# Patient Record
Sex: MALE | Race: WHITE | NOT HISPANIC OR LATINO | Employment: UNEMPLOYED | URBAN - METROPOLITAN AREA
[De-identification: names, ages, dates, MRNs, and addresses within clinical notes are randomized per-mention and may not be internally consistent; named-entity substitution may affect disease eponyms.]

---

## 2017-01-04 ENCOUNTER — OFFICE VISIT - RIVER FALLS (OUTPATIENT)
Dept: FAMILY MEDICINE | Facility: CLINIC | Age: 67
End: 2017-01-04

## 2018-01-08 ENCOUNTER — OFFICE VISIT - RIVER FALLS (OUTPATIENT)
Dept: FAMILY MEDICINE | Facility: CLINIC | Age: 68
End: 2018-01-08

## 2018-01-08 ASSESSMENT — MIFFLIN-ST. JEOR: SCORE: 1819.52

## 2018-01-09 LAB
CHOLEST SERPL-MCNC: 264 MG/DL
CHOLEST/HDLC SERPL: 6.4 {RATIO}
CREAT SERPL-MCNC: 1.14 MG/DL (ref 0.7–1.25)
GLUCOSE BLD-MCNC: 90 MG/DL (ref 65–99)
HDLC SERPL-MCNC: 41 MG/DL
LDLC SERPL CALC-MCNC: 191 MG/DL
NONHDLC SERPL-MCNC: 223 MG/DL
PSA SERPL-MCNC: 0.5 NG/ML
TRIGL SERPL-MCNC: 159 MG/DL

## 2018-02-14 ENCOUNTER — OFFICE VISIT - RIVER FALLS (OUTPATIENT)
Dept: FAMILY MEDICINE | Facility: CLINIC | Age: 68
End: 2018-02-14

## 2018-03-05 ENCOUNTER — OFFICE VISIT - RIVER FALLS (OUTPATIENT)
Dept: FAMILY MEDICINE | Facility: CLINIC | Age: 68
End: 2018-03-05

## 2018-03-05 ASSESSMENT — MIFFLIN-ST. JEOR: SCORE: 1819.52

## 2018-05-24 ENCOUNTER — AMBULATORY - RIVER FALLS (OUTPATIENT)
Dept: FAMILY MEDICINE | Facility: CLINIC | Age: 68
End: 2018-05-24

## 2018-05-25 LAB
CHOLEST SERPL-MCNC: 198 MG/DL
CHOLEST/HDLC SERPL: 4.4 {RATIO}
CREAT SERPL-MCNC: 1.24 MG/DL (ref 0.7–1.25)
GLUCOSE BLD-MCNC: 86 MG/DL (ref 65–99)
HDLC SERPL-MCNC: 45 MG/DL
LDLC SERPL CALC-MCNC: 132 MG/DL
NONHDLC SERPL-MCNC: 153 MG/DL
TRIGL SERPL-MCNC: 104 MG/DL

## 2018-05-29 ENCOUNTER — OFFICE VISIT - RIVER FALLS (OUTPATIENT)
Dept: FAMILY MEDICINE | Facility: CLINIC | Age: 68
End: 2018-05-29

## 2018-05-29 ASSESSMENT — MIFFLIN-ST. JEOR: SCORE: 1790.49

## 2018-05-30 ENCOUNTER — OFFICE VISIT - RIVER FALLS (OUTPATIENT)
Dept: FAMILY MEDICINE | Facility: CLINIC | Age: 68
End: 2018-05-30

## 2018-07-03 ENCOUNTER — OFFICE VISIT - RIVER FALLS (OUTPATIENT)
Dept: FAMILY MEDICINE | Facility: CLINIC | Age: 68
End: 2018-07-03

## 2018-07-03 ASSESSMENT — MIFFLIN-ST. JEOR: SCORE: 1789.58

## 2018-11-05 ENCOUNTER — AMBULATORY - RIVER FALLS (OUTPATIENT)
Dept: FAMILY MEDICINE | Facility: CLINIC | Age: 68
End: 2018-11-05

## 2018-12-10 ENCOUNTER — OFFICE VISIT - RIVER FALLS (OUTPATIENT)
Dept: FAMILY MEDICINE | Facility: CLINIC | Age: 68
End: 2018-12-10

## 2018-12-10 ASSESSMENT — MIFFLIN-ST. JEOR: SCORE: 1776.88

## 2018-12-11 LAB
CHOLEST SERPL-MCNC: 265 MG/DL
CHOLEST/HDLC SERPL: 5.5 {RATIO}
CREAT SERPL-MCNC: 1.21 MG/DL (ref 0.7–1.25)
GLUCOSE BLD-MCNC: 88 MG/DL (ref 65–99)
HDLC SERPL-MCNC: 48 MG/DL
LDLC SERPL CALC-MCNC: 188 MG/DL
NONHDLC SERPL-MCNC: 217 MG/DL
PSA SERPL-MCNC: 0.6 NG/ML
TRIGL SERPL-MCNC: 144 MG/DL

## 2019-03-27 ENCOUNTER — AMBULATORY - RIVER FALLS (OUTPATIENT)
Dept: FAMILY MEDICINE | Facility: CLINIC | Age: 69
End: 2019-03-27

## 2019-03-28 LAB
A/G RATIO - HISTORICAL: 1.7 (ref 1–2.5)
ALBUMIN SERPL-MCNC: 4.3 GM/DL (ref 3.6–5.1)
ALP SERPL-CCNC: 87 UNIT/L (ref 40–115)
ALT SERPL W P-5'-P-CCNC: 17 UNIT/L (ref 9–46)
AST SERPL W P-5'-P-CCNC: 25 UNIT/L (ref 10–35)
BILIRUB SERPL-MCNC: 0.7 MG/DL (ref 0.2–1.2)
BUN SERPL-MCNC: 20 MG/DL (ref 7–25)
BUN/CREAT RATIO - HISTORICAL: ABNORMAL (ref 6–22)
CALCIUM SERPL-MCNC: 9 MG/DL (ref 8.6–10.3)
CHLORIDE BLD-SCNC: 105 MMOL/L (ref 98–110)
CHOLEST SERPL-MCNC: 182 MG/DL
CHOLEST/HDLC SERPL: 4 {RATIO}
CO2 SERPL-SCNC: 24 MMOL/L (ref 20–32)
CREAT SERPL-MCNC: 1.23 MG/DL (ref 0.7–1.25)
EGFRCR SERPLBLD CKD-EPI 2021: 61 ML/MIN/1.73M2
ERYTHROCYTE [DISTWIDTH] IN BLOOD BY AUTOMATED COUNT: 13.1 % (ref 11–15)
GLOBULIN: 2.6 (ref 1.9–3.7)
GLUCOSE BLD-MCNC: 143 MG/DL (ref 65–99)
HCT VFR BLD AUTO: 43.5 % (ref 38.5–50)
HDLC SERPL-MCNC: 46 MG/DL
HGB BLD-MCNC: 15.5 GM/DL (ref 13.2–17.1)
LDLC SERPL CALC-MCNC: 107 MG/DL
MCH RBC QN AUTO: 31.6 PG (ref 27–33)
MCHC RBC AUTO-ENTMCNC: 35.6 GM/DL (ref 32–36)
MCV RBC AUTO: 88.6 FL (ref 80–100)
NONHDLC SERPL-MCNC: 136 MG/DL
PLATELET # BLD AUTO: 291 10*3/UL (ref 140–400)
PMV BLD: 10.4 FL (ref 7.5–12.5)
POTASSIUM BLD-SCNC: 3.7 MMOL/L (ref 3.5–5.3)
PROT SERPL-MCNC: 6.9 GM/DL (ref 6.1–8.1)
RBC # BLD AUTO: 4.91 10*6/UL (ref 4.2–5.8)
SODIUM SERPL-SCNC: 139 MMOL/L (ref 135–146)
TRIGL SERPL-MCNC: 177 MG/DL
WBC # BLD AUTO: 7.3 10*3/UL (ref 3.8–10.8)

## 2019-04-02 ENCOUNTER — OFFICE VISIT - RIVER FALLS (OUTPATIENT)
Dept: FAMILY MEDICINE | Facility: CLINIC | Age: 69
End: 2019-04-02

## 2019-04-02 ASSESSMENT — MIFFLIN-ST. JEOR: SCORE: 1791.4

## 2019-05-02 ENCOUNTER — OFFICE VISIT - RIVER FALLS (OUTPATIENT)
Dept: FAMILY MEDICINE | Facility: CLINIC | Age: 69
End: 2019-05-02

## 2019-05-02 ASSESSMENT — MIFFLIN-ST. JEOR: SCORE: 1784.14

## 2019-10-08 ENCOUNTER — AMBULATORY - RIVER FALLS (OUTPATIENT)
Dept: FAMILY MEDICINE | Facility: CLINIC | Age: 69
End: 2019-10-08

## 2019-10-23 ENCOUNTER — AMBULATORY - RIVER FALLS (OUTPATIENT)
Dept: FAMILY MEDICINE | Facility: CLINIC | Age: 69
End: 2019-10-23

## 2019-10-24 ENCOUNTER — COMMUNICATION - RIVER FALLS (OUTPATIENT)
Dept: FAMILY MEDICINE | Facility: CLINIC | Age: 69
End: 2019-10-24

## 2019-10-24 LAB
A/G RATIO - HISTORICAL: 1.6 (ref 1–2.5)
ALBUMIN SERPL-MCNC: 4.1 GM/DL (ref 3.6–5.1)
ALP SERPL-CCNC: 77 UNIT/L (ref 40–115)
ALT SERPL W P-5'-P-CCNC: 16 UNIT/L (ref 9–46)
AST SERPL W P-5'-P-CCNC: 20 UNIT/L (ref 10–35)
BILIRUB SERPL-MCNC: 0.5 MG/DL (ref 0.2–1.2)
BUN SERPL-MCNC: 20 MG/DL (ref 7–25)
BUN/CREAT RATIO - HISTORICAL: NORMAL (ref 6–22)
CALCIUM SERPL-MCNC: 9.4 MG/DL (ref 8.6–10.3)
CHLORIDE BLD-SCNC: 104 MMOL/L (ref 98–110)
CHOLEST SERPL-MCNC: 176 MG/DL
CHOLEST/HDLC SERPL: 3.5 {RATIO}
CO2 SERPL-SCNC: 26 MMOL/L (ref 20–32)
CREAT SERPL-MCNC: 1.21 MG/DL (ref 0.7–1.25)
EGFRCR SERPLBLD CKD-EPI 2021: 62 ML/MIN/1.73M2
ERYTHROCYTE [DISTWIDTH] IN BLOOD BY AUTOMATED COUNT: 12.8 % (ref 11–15)
GLOBULIN: 2.6 (ref 1.9–3.7)
GLUCOSE BLD-MCNC: 85 MG/DL (ref 65–99)
HCT VFR BLD AUTO: 44.5 % (ref 38.5–50)
HDLC SERPL-MCNC: 50 MG/DL
HGB BLD-MCNC: 15.7 GM/DL (ref 13.2–17.1)
LDLC SERPL CALC-MCNC: 105 MG/DL
MCH RBC QN AUTO: 31.5 PG (ref 27–33)
MCHC RBC AUTO-ENTMCNC: 35.3 GM/DL (ref 32–36)
MCV RBC AUTO: 89.4 FL (ref 80–100)
NONHDLC SERPL-MCNC: 126 MG/DL
PLATELET # BLD AUTO: 279 10*3/UL (ref 140–400)
PMV BLD: 10.1 FL (ref 7.5–12.5)
POTASSIUM BLD-SCNC: 4.2 MMOL/L (ref 3.5–5.3)
PROT SERPL-MCNC: 6.7 GM/DL (ref 6.1–8.1)
RBC # BLD AUTO: 4.98 10*6/UL (ref 4.2–5.8)
SODIUM SERPL-SCNC: 140 MMOL/L (ref 135–146)
TRIGL SERPL-MCNC: 113 MG/DL
WBC # BLD AUTO: 7 10*3/UL (ref 3.8–10.8)

## 2019-11-07 ENCOUNTER — OFFICE VISIT - RIVER FALLS (OUTPATIENT)
Dept: FAMILY MEDICINE | Facility: CLINIC | Age: 69
End: 2019-11-07

## 2019-11-07 ASSESSMENT — MIFFLIN-ST. JEOR: SCORE: 1780.51

## 2020-04-09 ENCOUNTER — OFFICE VISIT - RIVER FALLS (OUTPATIENT)
Dept: FAMILY MEDICINE | Facility: CLINIC | Age: 70
End: 2020-04-09

## 2020-05-11 ENCOUNTER — COMMUNICATION - RIVER FALLS (OUTPATIENT)
Dept: FAMILY MEDICINE | Facility: CLINIC | Age: 70
End: 2020-05-11

## 2020-07-01 ENCOUNTER — AMBULATORY - RIVER FALLS (OUTPATIENT)
Dept: FAMILY MEDICINE | Facility: CLINIC | Age: 70
End: 2020-07-01

## 2020-07-02 LAB
A/G RATIO - HISTORICAL: 1.8 (ref 1–2.5)
ALBUMIN SERPL-MCNC: 4.4 GM/DL (ref 3.6–5.1)
ALP SERPL-CCNC: 81 UNIT/L (ref 35–144)
ALT SERPL W P-5'-P-CCNC: 26 UNIT/L (ref 9–46)
AST SERPL W P-5'-P-CCNC: 30 UNIT/L (ref 10–35)
BILIRUB SERPL-MCNC: 0.7 MG/DL (ref 0.2–1.2)
BUN SERPL-MCNC: 23 MG/DL (ref 7–25)
BUN/CREAT RATIO - HISTORICAL: NORMAL (ref 6–22)
CALCIUM SERPL-MCNC: 9.6 MG/DL (ref 8.6–10.3)
CHLORIDE BLD-SCNC: 104 MMOL/L (ref 98–110)
CHOLEST SERPL-MCNC: 203 MG/DL
CHOLEST/HDLC SERPL: 4 {RATIO}
CO2 SERPL-SCNC: 24 MMOL/L (ref 20–32)
CREAT SERPL-MCNC: 1.1 MG/DL (ref 0.7–1.25)
EGFRCR SERPLBLD CKD-EPI 2021: 69 ML/MIN/1.73M2
ERYTHROCYTE [DISTWIDTH] IN BLOOD BY AUTOMATED COUNT: 12.8 % (ref 11–15)
GLOBULIN: 2.5 (ref 1.9–3.7)
GLUCOSE BLD-MCNC: 93 MG/DL (ref 65–99)
HCT VFR BLD AUTO: 46.8 % (ref 38.5–50)
HDLC SERPL-MCNC: 51 MG/DL
HGB BLD-MCNC: 16 GM/DL (ref 13.2–17.1)
LDLC SERPL CALC-MCNC: 126 MG/DL
MCH RBC QN AUTO: 31.2 PG (ref 27–33)
MCHC RBC AUTO-ENTMCNC: 34.2 GM/DL (ref 32–36)
MCV RBC AUTO: 91.2 FL (ref 80–100)
NONHDLC SERPL-MCNC: 152 MG/DL
PLATELET # BLD AUTO: 307 10*3/UL (ref 140–400)
PMV BLD: 10.2 FL (ref 7.5–12.5)
POTASSIUM BLD-SCNC: 4.1 MMOL/L (ref 3.5–5.3)
PROT SERPL-MCNC: 6.9 GM/DL (ref 6.1–8.1)
PSA SERPL-MCNC: 1.3 NG/ML
RBC # BLD AUTO: 5.13 10*6/UL (ref 4.2–5.8)
SODIUM SERPL-SCNC: 138 MMOL/L (ref 135–146)
TRIGL SERPL-MCNC: 145 MG/DL
WBC # BLD AUTO: 7.5 10*3/UL (ref 3.8–10.8)

## 2020-07-06 ENCOUNTER — COMMUNICATION - RIVER FALLS (OUTPATIENT)
Dept: FAMILY MEDICINE | Facility: CLINIC | Age: 70
End: 2020-07-06

## 2020-07-07 ENCOUNTER — OFFICE VISIT - RIVER FALLS (OUTPATIENT)
Dept: FAMILY MEDICINE | Facility: CLINIC | Age: 70
End: 2020-07-07

## 2020-07-07 ASSESSMENT — MIFFLIN-ST. JEOR: SCORE: 1780.51

## 2021-02-10 ENCOUNTER — TRANSFERRED RECORDS (OUTPATIENT)
Dept: MULTI SPECIALTY CLINIC | Facility: CLINIC | Age: 71
End: 2021-02-10
Payer: COMMERCIAL

## 2021-02-10 ENCOUNTER — OFFICE VISIT - RIVER FALLS (OUTPATIENT)
Dept: FAMILY MEDICINE | Facility: CLINIC | Age: 71
End: 2021-02-10

## 2021-02-10 ASSESSMENT — MIFFLIN-ST. JEOR: SCORE: 1821.33

## 2021-02-11 ENCOUNTER — COMMUNICATION - RIVER FALLS (OUTPATIENT)
Dept: FAMILY MEDICINE | Facility: CLINIC | Age: 71
End: 2021-02-11

## 2021-02-11 LAB
A/G RATIO - HISTORICAL: 1.6 (ref 1–2.5)
ALBUMIN SERPL-MCNC: 4.3 GM/DL (ref 3.6–5.1)
ALP SERPL-CCNC: 80 UNIT/L (ref 35–144)
ALT SERPL W P-5'-P-CCNC: 15 UNIT/L (ref 9–46)
AST SERPL W P-5'-P-CCNC: 17 UNIT/L (ref 10–35)
BILIRUB SERPL-MCNC: 0.5 MG/DL (ref 0.2–1.2)
BUN SERPL-MCNC: 24 MG/DL (ref 7–25)
BUN/CREAT RATIO - HISTORICAL: ABNORMAL (ref 6–22)
CALCIUM SERPL-MCNC: 9.6 MG/DL (ref 8.6–10.3)
CHLORIDE BLD-SCNC: 101 MMOL/L (ref 98–110)
CHOLEST SERPL-MCNC: 208 MG/DL
CHOLEST/HDLC SERPL: 4.2 {RATIO}
CO2 SERPL-SCNC: 30 MMOL/L (ref 20–32)
CREAT SERPL-MCNC: 1.24 MG/DL (ref 0.7–1.25)
EGFRCR SERPLBLD CKD-EPI 2021: 59 ML/MIN/1.73M2
ERYTHROCYTE [DISTWIDTH] IN BLOOD BY AUTOMATED COUNT: 13 % (ref 11–15)
GLOBULIN: 2.7 (ref 1.9–3.7)
GLUCOSE BLD-MCNC: 86 MG/DL (ref 65–99)
HCT VFR BLD AUTO: 45.3 % (ref 38.5–50)
HDLC SERPL-MCNC: 49 MG/DL
HGB BLD-MCNC: 15.6 GM/DL (ref 13.2–17.1)
LDLC SERPL CALC-MCNC: 138 MG/DL
MCH RBC QN AUTO: 31 PG (ref 27–33)
MCHC RBC AUTO-ENTMCNC: 34.4 GM/DL (ref 32–36)
MCV RBC AUTO: 89.9 FL (ref 80–100)
NONHDLC SERPL-MCNC: 159 MG/DL
PLATELET # BLD AUTO: 273 10*3/UL (ref 140–400)
PMV BLD: 9.9 FL (ref 7.5–12.5)
POTASSIUM BLD-SCNC: 4.1 MMOL/L (ref 3.5–5.3)
PROT SERPL-MCNC: 7 GM/DL (ref 6.1–8.1)
RBC # BLD AUTO: 5.04 10*6/UL (ref 4.2–5.8)
SODIUM SERPL-SCNC: 138 MMOL/L (ref 135–146)
TRIGL SERPL-MCNC: 106 MG/DL
WBC # BLD AUTO: 6.7 10*3/UL (ref 3.8–10.8)

## 2021-08-02 ENCOUNTER — AMBULATORY - RIVER FALLS (OUTPATIENT)
Dept: FAMILY MEDICINE | Facility: CLINIC | Age: 71
End: 2021-08-02

## 2021-08-03 ENCOUNTER — COMMUNICATION - RIVER FALLS (OUTPATIENT)
Dept: FAMILY MEDICINE | Facility: CLINIC | Age: 71
End: 2021-08-03

## 2021-08-03 LAB
BUN SERPL-MCNC: 21 MG/DL (ref 7–25)
BUN/CREAT RATIO - HISTORICAL: NORMAL (ref 6–22)
CALCIUM SERPL-MCNC: 9.3 MG/DL (ref 8.6–10.3)
CHLORIDE BLD-SCNC: 104 MMOL/L (ref 98–110)
CHOLEST SERPL-MCNC: 186 MG/DL
CHOLEST/HDLC SERPL: 3.9 {RATIO}
CO2 SERPL-SCNC: 28 MMOL/L (ref 20–32)
CREAT SERPL-MCNC: 1.06 MG/DL (ref 0.7–1.25)
EGFRCR SERPLBLD CKD-EPI 2021: 71 ML/MIN/1.73M2
GLUCOSE BLD-MCNC: 88 MG/DL (ref 65–99)
HDLC SERPL-MCNC: 48 MG/DL
LDLC SERPL CALC-MCNC: 113 MG/DL
NONHDLC SERPL-MCNC: 138 MG/DL
POTASSIUM BLD-SCNC: 4.4 MMOL/L (ref 3.5–5.3)
SODIUM SERPL-SCNC: 140 MMOL/L (ref 135–146)
TRIGL SERPL-MCNC: 135 MG/DL

## 2021-08-06 ENCOUNTER — OFFICE VISIT - RIVER FALLS (OUTPATIENT)
Dept: FAMILY MEDICINE | Facility: CLINIC | Age: 71
End: 2021-08-06

## 2021-08-06 ASSESSMENT — MIFFLIN-ST. JEOR: SCORE: 1808.63

## 2021-10-04 ENCOUNTER — COMMUNICATION - RIVER FALLS (OUTPATIENT)
Dept: FAMILY MEDICINE | Facility: CLINIC | Age: 71
End: 2021-10-04

## 2022-02-12 VITALS
WEIGHT: 235 LBS | BODY MASS INDEX: 34.8 KG/M2 | HEART RATE: 72 BPM | DIASTOLIC BLOOD PRESSURE: 74 MMHG | OXYGEN SATURATION: 97 % | HEIGHT: 69 IN | SYSTOLIC BLOOD PRESSURE: 122 MMHG

## 2022-02-12 VITALS
DIASTOLIC BLOOD PRESSURE: 84 MMHG | BODY MASS INDEX: 33.83 KG/M2 | WEIGHT: 226.8 LBS | BODY MASS INDEX: 33.59 KG/M2 | SYSTOLIC BLOOD PRESSURE: 132 MMHG | HEIGHT: 69 IN | HEART RATE: 72 BPM | OXYGEN SATURATION: 97 % | OXYGEN SATURATION: 98 % | TEMPERATURE: 97.2 F | HEART RATE: 78 BPM | DIASTOLIC BLOOD PRESSURE: 82 MMHG | HEIGHT: 69 IN | WEIGHT: 228.4 LBS | SYSTOLIC BLOOD PRESSURE: 130 MMHG

## 2022-02-12 VITALS
DIASTOLIC BLOOD PRESSURE: 78 MMHG | HEART RATE: 65 BPM | HEIGHT: 69 IN | SYSTOLIC BLOOD PRESSURE: 116 MMHG | WEIGHT: 228 LBS | BODY MASS INDEX: 33.77 KG/M2 | TEMPERATURE: 97.3 F

## 2022-02-12 VITALS
TEMPERATURE: 97.5 F | HEART RATE: 79 BPM | OXYGEN SATURATION: 98 % | WEIGHT: 225.2 LBS | HEIGHT: 69 IN | BODY MASS INDEX: 33.36 KG/M2 | DIASTOLIC BLOOD PRESSURE: 86 MMHG | SYSTOLIC BLOOD PRESSURE: 146 MMHG

## 2022-02-12 VITALS
BODY MASS INDEX: 33.8 KG/M2 | SYSTOLIC BLOOD PRESSURE: 136 MMHG | TEMPERATURE: 97.9 F | SYSTOLIC BLOOD PRESSURE: 126 MMHG | BODY MASS INDEX: 34.75 KG/M2 | DIASTOLIC BLOOD PRESSURE: 74 MMHG | WEIGHT: 228.2 LBS | HEART RATE: 65 BPM | OXYGEN SATURATION: 97 % | HEART RATE: 94 BPM | DIASTOLIC BLOOD PRESSURE: 72 MMHG | WEIGHT: 234.6 LBS | HEIGHT: 69 IN | OXYGEN SATURATION: 97 % | HEIGHT: 69 IN

## 2022-02-12 VITALS
HEIGHT: 69 IN | OXYGEN SATURATION: 97 % | BODY MASS INDEX: 33.47 KG/M2 | WEIGHT: 226 LBS | DIASTOLIC BLOOD PRESSURE: 74 MMHG | HEART RATE: 72 BPM | SYSTOLIC BLOOD PRESSURE: 122 MMHG

## 2022-02-12 VITALS
BODY MASS INDEX: 34.75 KG/M2 | HEIGHT: 69 IN | DIASTOLIC BLOOD PRESSURE: 80 MMHG | WEIGHT: 234.6 LBS | SYSTOLIC BLOOD PRESSURE: 128 MMHG | OXYGEN SATURATION: 97 % | HEART RATE: 70 BPM

## 2022-02-12 VITALS
SYSTOLIC BLOOD PRESSURE: 119 MMHG | HEIGHT: 69 IN | OXYGEN SATURATION: 97 % | BODY MASS INDEX: 34.39 KG/M2 | TEMPERATURE: 97.2 F | HEART RATE: 68 BPM | WEIGHT: 232.2 LBS | DIASTOLIC BLOOD PRESSURE: 83 MMHG

## 2022-02-12 VITALS — HEIGHT: 69 IN | BODY MASS INDEX: 33.37 KG/M2

## 2022-02-12 VITALS
WEIGHT: 226 LBS | BODY MASS INDEX: 33.47 KG/M2 | HEIGHT: 69 IN | OXYGEN SATURATION: 97 % | HEART RATE: 71 BPM | SYSTOLIC BLOOD PRESSURE: 126 MMHG | DIASTOLIC BLOOD PRESSURE: 80 MMHG

## 2022-02-15 NOTE — PROGRESS NOTES
Patient:   ABELARDO TINOCO            MRN: [id number]            FIN: [id number]               Age:   68 years     Sex:  Male     :  1950   Associated Diagnoses:   Chronic GERD; Familial Hyperlipidemia; Benign essential HTN   Author:   Osman Courtney MD      Impression and Plan   Diagnosis     Chronic GERD (RTC03-TY K21.9).     Course:  Progressing as expected.    Orders     Orders   Charges (Evaluation and Management):  90887 office outpatient visit 25 minutes (Charge) (Order): Quantity: 1, Chronic GERD  Benign essential HTN  Familial Hyperlipidemia.     Orders (Selected)   Prescriptions  Prescribe  raNITIdine 75 mg oral tablet: = 1 tab(s) ( 75 mg ), PO, daily, # 90 tab(s), 1 Refill(s), Type: Maintenance, Pharmacy: [facility], 1 tab(s) Oral daily.     Diagnosis     Familial Hyperlipidemia (PNL41-RK E78.49).     Course:  Progressing as expected.    Orders     Orders (Selected)   Prescriptions  Prescribed  atorvastatin 40 mg oral tablet: = 0.5 tab(s) ( 20 mg ), PO, Daily, # 90 tab(s), 1 Refill(s), Type: Maintenance, Pharmacy: [facility], 0.5 tab(s) Oral daily.     Diagnosis     Benign essential HTN (YPY57-RT I10).     Course:  Well controlled.    Orders     Orders (Selected)   Prescriptions  Prescribed  hydrochlorothiazide-triamterene 25 mg-37.5 mg oral tablet: 1 tab(s), Oral, daily, Instructions: Pt due for appt, # 90 tab(s), 1 Refill(s), CHERY, Type: Maintenance, Pharmacy: [facility], Pt will need appt for refills, 1 tab(s) Oral daily,Instr:Pt due for appt.        Visit Information      Date of Service: 2019 08:19 am  Performing Location: [facility]  Encounter#: [id number]      Primary Care Provider (PCP):  Osman Courtney MD    NPI# [id number]   Visit type:  Scheduled follow-up.    Accompanied by:  No one.    Source of history:  Self.    Referral source:  Self.    History limitation:  None.       Chief Complaint   2019 8:29 AM CDT     Pt here for med ck         History of Present Illness             The patient presents for follow-up evaluation of hypertension.  The quality of hypertension symptom(s) since the patient's last visit is described as being unchanged.  The severity of the hypertension symptom(s) since the last visit is moderate.  Since the patient's last visit, the timing/course of hypertension symptom(s) is constant.  Exacerbating factors consist of none.  Relieving factors consist of medication.  Associated symptoms consist of none.  Prior treatment consists of lifestyle modification (weight reduction, dietary sodium restriction, increased physical activity, adoption of DASH eating plan).  Medical encounters: none.  Compliance problems: none.               The patient presents with a knee problem.  The location of the knee problem is generalized, the right knee.  The knee problem is characterized by popping and pain.  The severity of the knee problem is severe.  The timing/course of symptom(s) associated with the knee problem is constant.  The knee problem has lasted for 1 week(s).  Radiation of pain: none.  There are no modifying factors.  Associated symptoms consist of none.        Interval History   Cholesterol Management   Total cholesterol results < 200 mg/dL (optimal).  LDL cholesterol results < 100 mg/dL (optimal).  Triglyceride results < 150 mg/dL (normal).  The course is progressing as expected.  The effect on daily activities is no change in activity level and no change in eating habits.  Associated symptoms characterized by no fatigue, chest pain, joint pain, muscle weakness or myalgias.     Reflux Esophagitis   The course is progressing as expected.  Compliance problems: none.  The effect on daily activities is no change in eating habits, no change in sleeping patterns and no change in work/school duties.  There were disease related encounters including none.  Associated symptoms characterized by no weight loss, chest pain, wheeze, epigastric  pain, hematemesis, loss of appetite or nausea.        Review of Systems   Constitutional:  Negative.    Eye:  Negative.    Ear/Nose/Mouth/Throat:  Negative.    Respiratory:  Negative.    Cardiovascular:  Negative.    Gastrointestinal:  Negative.    Genitourinary:  Negative.    Hematology/Lymphatics:  Negative.    Endocrine:  Negative.    Immunologic:  Negative.    Musculoskeletal:  Joint pain.    Integumentary:  Negative.    Neurologic:  Negative.    Psychiatric:  Negative.    All other systems reviewed and negative      Health Status   Allergies:    Allergic Reactions (Selected)  No known allergies   Medications:  (Selected)   Prescriptions  Prescribed  aspirin 81 mg oral tablet: 1 tab(s) ( 81 mg ), po, daily, # 30 tab(s), 11 Refill(s), Type: Maintenance, patient has supply at home (Rx)  atorvastatin 40 mg oral tablet: = 0.5 tab(s) ( 20 mg ), PO, Daily, # 90 tab(s), 1 Refill(s), Type: Maintenance, Pharmacy: [facility], 0.5 tab(s) Oral daily  fluorouracil 5% topical cream: See Instructions, Instructions: as directed by physician, # 40 gm, Type: Soft Stop, Pharmacy: [facility], as directed by physician  hydrochlorothiazide-triamterene 25 mg-37.5 mg oral tablet: 1 tab(s), Oral, daily, Instructions: Pt due for appt, # 90 tab(s), 1 Refill(s), CHERY, Type: Maintenance, Pharmacy: [facility], Pt will need appt for refills, 1 tab(s) Oral daily,Instr:Pt due for appt  raNITIdine 75 mg oral tablet: 1 tab(s) ( 75 mg ), PO, daily, # 30 tab(s), 11 Refill(s), Type: Maintenance, Pharmacy: [facility], 1 tab(s) po daily,x30 day(s)   Problem list:    All Problems  Adenomatous polyp of colon / SNOMED CT 0129717014 / Confirmed  Benign essential HTN / SNOMED CT 6456802 / Confirmed  Chronic GERD / SNOMED CT 3101151368 / Confirmed  Obesity / ICD-9-.00 / Probable  Other allergic rhinitis / SNOMED CT 857007029 / Confirmed  Tear of meniscus of right knee / SNOMED CT 916603957 / Confirmed  Resolved: Familial  Hyperlipidemia / ICD-9-.4      Histories   Past Medical History:    Resolved  Familial Hyperlipidemia (272.4):  Resolved.   Family History:    Cancer  Mother ()  Grandmother (M)     Procedure history:    Bucket handle tear of lateral meniscus of knee (SNOMED CT 41952) performed by Malik Sumner DO on 2018 at 67 Years.  Comments:  2018 2:08 PM CDT - Elizabeth Armstrong.  Colonoscopy (SNOMED CT 789468034) performed by Ty Somers MD on 2018 at 67 Years.  Comments:  2018 3:34 PM CST - Ty Somers MD  Indication: Screening  Sedation: Fentanyl 100 mcg, Versed 2 mg  Findings: One tubular adenoma, diverticulosis  Rec: Repeat in 5 years  Tonsillectomy and adenoidectomy (SNOMED CT 536478310).   Social History:        Alcohol Assessment: Low Risk      Tobacco Assessment: Past                     Comments:                      2012 - Viv Rick CMA                     Quit x 20 yrs      Substance Abuse Assessment: Denies Substance Abuse      Employment and Education Assessment            Retired                     Comments:                      2018 - Osman Courtney MD                     Retired                       2012 - Darryl Stark MD      Home and Environment Assessment            Marital status: .      Exercise and Physical Activity Assessment: Does not exercise      Sexual Assessment            Sexually active: Yes.  Sexual orientation: Heterosexual.      Physical Examination   Vital Signs   2019 8:29 AM CDT Peripheral Pulse Rate 72 bpm    Systolic Blood Pressure 130 mmHg    Diastolic Blood Pressure 84 mmHg  HI    Mean Arterial Pressure 99 mmHg    Oxygen Saturation 97 %      Measurements from flowsheet : Measurements   2019 8:29 AM CDT Height Measured - Standard 69 in    Weight Measured - Standard 228.4 lb    BSA 2.24 m2    Body Mass Index 33.73 kg/m2  HI      General:  No acute distress.    Neck:   Supple, No lymphadenopathy, No thyromegaly.    Respiratory:  Lungs are clear to auscultation, Respirations are non-labored, Breath sounds are equal, Symmetrical chest wall expansion.    Cardiovascular:  Normal rate, Regular rhythm, No murmur, No gallop, Good pulses equal in all extremities, Normal peripheral perfusion, No edema.    Gastrointestinal:  Soft, Non-tender, Non-distended, Normal bowel sounds, No organomegaly.    Musculoskeletal:       Lower extremity exam: Knee ( Right, No deformity, No erythema, No ecchymosis, No effusion, No mass, No nodule, No swelling, Tenderness, No wound, No numbness, Range of motion ( Diminished ), Clayton test ( Positive ( On the right ) ) ).    Integumentary:  Warm, Dry, Pink.    Neurologic:  Alert, Oriented.    Psychiatric:  Cooperative, Appropriate mood & affect.       Review / Management   Results review:  Lab results   3/27/2019 10:53 AM CDT Sodium Level 139 mmol/L    Potassium Level 3.7 mmol/L    Chloride Level 105 mmol/L    CO2 Level 24 mmol/L    Glucose Level 143 mg/dL  HI    BUN 20 mg/dL    Creatinine Level 1.23 mg/dL    BUN/Creat Ratio NOT APPLICABLE    eGFR 61 mL/min/1.73m2    eGFR African American 70 mL/min/1.73m2    Calcium Level 9.0 mg/dL    Bilirubin Total 0.7 mg/dL    Alkaline Phosphatase 87 unit/L    AST/SGOT 25 unit/L    ALT/SGPT 17 unit/L    Protein Total 6.9 gm/dL    Albumin Level 4.3 gm/dL    Globulin 2.6    A/G Ratio 1.7    Cholesterol 182 mg/dL    Non-HDL Cholesterol 136  HI    HDL 46 mg/dL    Cholesterol/HDL Ratio 4.0      HI    Triglyceride 177 mg/dL  HI    WBC 7.3    RBC 4.91    Hgb 15.5 gm/dL    Hct 43.5 %    MCV 88.6 fL    MCH 31.6 pg    MCHC 35.6 gm/dL    RDW 13.1 %    Platelet 291    MPV 10.4 fL   .

## 2022-02-15 NOTE — NURSING NOTE
Comprehensive Intake Entered On:  7/7/2020 10:04 AM CDT    Performed On:  7/7/2020 10:02 AM CDT by Clarita Espinoza CMA               Summary   Chief Complaint :   Pt here for med ck   Weight Measured :   226 lb(Converted to: 226 lb 0 oz, 102.51 kg)    Height Measured :   69 in(Converted to: 5 ft 9 in, 175.26 cm)    Body Mass Index :   33.37 kg/m2 (HI)    Body Surface Area :   2.23 m2   Systolic Blood Pressure :   122 mmHg   Diastolic Blood Pressure :   74 mmHg   Mean Arterial Pressure :   90 mmHg   Peripheral Pulse Rate :   72 bpm   Oxygen Saturation :   97 %   Clarita Espinoza CMA - 7/7/2020 10:02 AM CDT   Health Status   Allergies Verified? :   Yes   Medication History Verified? :   Yes   Medical History Verified? :   Yes   Pre-Visit Planning Status :   Completed   Tobacco Use? :   Former smoker   Clarita Espinoza CMA - 7/7/2020 10:02 AM CDT   Consents   Consent for Immunization Exchange :   Consent Granted   Consent for Immunizations to Providers :   Consent Granted   Clarita Espinoza CMA - 7/7/2020 10:02 AM CDT   Meds / Allergies   (As Of: 7/7/2020 10:04:45 AM CDT)   Allergies (Active)   No Known Medication Allergies  Estimated Onset Date:   Unspecified ; Created By:   Michelle Butler CMA; Reaction Status:   Active ; Category:   Drug ; Substance:   No Known Medication Allergies ; Type:   Allergy ; Updated By:   Michelle Butler CMA; Reviewed Date:   7/7/2020 10:03 AM CDT        Medication List   (As Of: 7/7/2020 10:04:45 AM CDT)   Prescription/Discharge Order    aspirin  :   aspirin ; Status:   Prescribed ; Ordered As Mnemonic:   aspirin 81 mg oral tablet ; Simple Display Line:   81 mg, 1 tab(s), po, daily, 30 tab(s), 11 Refill(s) ; Ordering Provider:   Osman Courtney MD; Catalog Code:   aspirin ; Order Dt/Tm:   1/8/2018 8:43:49 AM CST          atorvastatin  :   atorvastatin ; Status:   Prescribed ; Ordered As Mnemonic:   atorvastatin 40 mg oral tablet ; Simple Display Line:   20 mg, 0.5 tab(s), PO, Daily, 90  tab(s), 1 Refill(s) ; Ordering Provider:   Osman Courtney MD; Catalog Code:   atorvastatin ; Order Dt/Tm:   11/7/2019 10:21:20 AM CST          cephalexin  :   cephalexin ; Status:   Processing ; Ordered As Mnemonic:   cephalexin 500 mg oral capsule ; Ordering Provider:   Osman Courtney MD; Action Display:   Complete ; Catalog Code:   cephalexin ; Order Dt/Tm:   7/7/2020 10:03:07 AM CDT          famotidine  :   famotidine ; Status:   Prescribed ; Ordered As Mnemonic:   famotidine 40 mg oral tablet ; Simple Display Line:   40 mg, 1 tab(s), Oral, hs, 90 tab(s), 0 Refill(s) ; Ordering Provider:   Osman Courtney MD; Catalog Code:   famotidine ; Order Dt/Tm:   11/7/2019 10:21:51 AM CST          fluorouracil 5% topical cream  :   fluorouracil 5% topical cream ; Status:   Prescribed ; Ordered As Mnemonic:   fluorouracil 5% topical cream ; Simple Display Line:   See Instructions, as directed by physician, 40 gm ; Ordering Provider:   Osman Courtney MD; Catalog Code:   fluorouracil topical ; Order Dt/Tm:   6/12/2018 4:10:04 PM CDT          hydrochlorothiazide-triamterene  :   hydrochlorothiazide-triamterene ; Status:   Prescribed ; Ordered As Mnemonic:   hydrochlorothiazide-triamterene 25 mg-37.5 mg oral tablet ; Simple Display Line:   1 tab(s), Oral, daily, 90 tab(s), 1 Refill(s) ; Ordering Provider:   Osman Courtney MD; Catalog Code:   hydrochlorothiazide-triamterene ; Order Dt/Tm:   5/11/2020 10:33:03 AM CDT            ID Risk Screen   Recent Travel History :   No recent travel   Family Member Travel History :   No recent travel   Other Exposure to Infectious Disease :   Unknown   Clarita Espinoza CMA - 7/7/2020 10:02 AM CDT

## 2022-02-15 NOTE — PROGRESS NOTES
Patient:   ABELARDO TINOCO            MRN: [id number]            FIN: [id number]               Age:   67 years     Sex:  Male     :  1950   Associated Diagnoses:   Tear of meniscus of right knee   Author:   Osman Courtney MD      Impression and Plan   Diagnosis     Tear of meniscus of right knee (AJU94-PB S83.281D).     Condition:  Stable, no contraindication for general anesthesia or surgical procedure..    Orders     Orders   Charges (Evaluation and Management):  52338 office outpatient visit 25 minutes (Charge) (Order): Quantity: 1, Tear of meniscus of right knee.        Preoperative Information   Indication for surgery:  Musculoskeletal disorder.         Associated symptoms: Right knee pain not improving with conservative treatment.    Accompanied by:  No one.    Source of history:  Self.    History limitation:  None.       Chief Complaint   Chief complaint discussed and confirmed correct.     7/3/2018 8:45 AM CDT     Pt here for R knee surgery preop, DOS 18 @ [facility] with Dr. Malik Sumner (from [facility]).        Review of Systems   Constitutional:  Negative.    Eye:  Negative.    Ear/Nose/Mouth/Throat:  Negative.    Respiratory:  Negative.    Cardiovascular:  Negative.    Gastrointestinal:  Negative.    Genitourinary:  Negative.    Hematology/Lymphatics:  Negative.    Endocrine:  Negative.    Immunologic:  Negative.    Musculoskeletal:  Negative.    Integumentary:  Negative.    Neurologic:  Negative.    Psychiatric:  Negative.    All other systems reviewed and negative   No personal or family history of anesthesia reactions  No history of bleeding or blood clotting disorders  No history of heart murmur or need for prophylactic antibiotics  No history of blood transfusion  No history of recent infectious contacts       Health Status   Allergies:    Allergic Reactions (Selected)  No known allergies   Medications:  (Selected)   Prescriptions  Prescribed  aspirin 81 mg oral tablet: 1 tab(s) ( 81 mg ), po,  daily, # 30 tab(s), 11 Refill(s), Type: Maintenance, patient has supply at home (Rx)  atorvastatin 40 mg oral tablet: 0.5 tab(s) ( 20 mg ), PO, Daily, # 90 tab(s), 1 Refill(s), Type: Maintenance, Pharmacy: [facility]  fluorouracil 5% topical cream: See Instructions, Instructions: as directed by physician, # 40 gm, Type: Soft Stop, Pharmacy: [facility], as directed by physician  hydrochlorothiazide-triamterene 25 mg-37.5 mg oral tablet: 1 tab(s), po, daily, # 90 tab(s), 1 Refill(s), Type: Maintenance, Pharmacy: [facility], 1 tab(s) po daily  raNITIdine 75 mg oral tablet: 1 tab(s) ( 75 mg ), PO, daily, # 30 tab(s), 11 Refill(s), Type: Maintenance, Pharmacy: [facility], 1 tab(s) po daily,x30 day(s)   Problem list:    All Problems  Adenomatous polyp of colon / SNOMED CT 4522851285 / Confirmed  Benign essential HTN / SNOMED CT 7429447 / Confirmed  Chronic GERD / SNOMED CT 6934441080 / Confirmed  Obesity / ICD-9-.00 / Probable  Other allergic rhinitis / SNOMED CT 888038526 / Confirmed  Tear of meniscus of right knee / SNOMED CT 907014622 / Confirmed  Resolved: Familial Hyperlipidemia / ICD-9-.4      Histories   Past Medical History:    Resolved  Familial Hyperlipidemia (272.4):  Resolved.   Family History:    Cancer  Mother ()  Grandmother (M)     Procedure history:    Colonoscopy (SNOMED CT 753954723) performed by Ty Somers MD on 2018 at 67 Years.  Comments:  2018 3:34 PM - Ty Somers MD  Indication: Screening  Sedation: Fentanyl 100 mcg, Versed 2 mg  Findings: One tubular adenoma, diverticulosis  Rec: Repeat in 5 years  Tonsillectomy and adenoidectomy (SNOMED CT 461521428).   Social History:        Alcohol Assessment: Low Risk      Tobacco Assessment: Past                     Comments:                      2012 - Merline ESPAÑAViv                     Quit x 20 yrs      Substance Abuse Assessment: Denies Substance Abuse      Employment and  Education Assessment            Retired                     Comments:                      07/03/2018 - Sherwin IGLESIAS, Osman                     Retired                       03/28/2012 - Mi IGLESIAS, Darryl bower      Home and Environment Assessment            Marital status: .      Exercise and Physical Activity Assessment: Does not exercise      Sexual Assessment            Sexually active: Yes.  Sexual orientation: Heterosexual.        Physical Examination   Vital signs reviewed  and within acceptable limits    Vital Signs   7/3/2018 8:45 AM CDT Temperature Tympanic 97.3 DegF  LOW    Peripheral Pulse Rate 65 bpm    Pulse Site Radial artery    HR Method Electronic    Systolic Blood Pressure 116 mmHg    Diastolic Blood Pressure 78 mmHg    Mean Arterial Pressure 91 mmHg    BP Site Left arm    BP Method Manual      Measurements from flowsheet : Measurements   7/3/2018 8:45 AM CDT Height Measured - Standard 69 in    Weight Measured - Standard 228.0 lb    BSA 2.24 m2    Body Mass Index 33.67 kg/m2  HI      General:  Alert and oriented, No acute distress.    Eye:  Pupils are equal, round and reactive to light, Extraocular movements are intact, Normal conjunctiva.    HENT:  Normocephalic, Tympanic membranes are clear, Normal hearing, Oral mucosa is moist, No pharyngeal erythema.    Neck:  Supple, Non-tender, No carotid bruit, No jugular venous distention, No lymphadenopathy, No thyromegaly.    Respiratory:  Lungs are clear to auscultation, Respirations are non-labored, Breath sounds are equal, Symmetrical chest wall expansion.    Cardiovascular:  Normal rate, Regular rhythm, No murmur, No gallop, Good pulses equal in all extremities, Normal peripheral perfusion, No edema.    Gastrointestinal:  Soft, Non-tender, Non-distended, Normal bowel sounds, No organomegaly.    Lymphatics:  No lymphadenopathy neck, axilla, groin.    Musculoskeletal:  Normal range of motion, Normal strength, No swelling, No  deformity, walks with limp favoring right knee.    Integumentary:  Warm, Dry, Pink.    Neurologic:  Normal sensory, Normal motor function, No focal deficits.    Psychiatric:  Cooperative, Appropriate mood & affect, Normal judgment.       Review / Management   Results review:  Lab results   5/24/2018 11:52 AM CDT Sodium Level 141 mmol/L    Potassium Level 4.2 mmol/L    Chloride Level 106 mmol/L    CO2 Level 25 mmol/L    Glucose Level 86 mg/dL    BUN 30 mg/dL  HI    Creatinine 1.24 mg/dL    BUN/Creat Ratio 24  HI    eGFR 61 mL/min/1.73m2    eGFR African American 70 mL/min/1.73m2    Calcium Level 9.3 mg/dL    Bili Total 0.7 mg/dL    Alk Phos 80 unit/L    AST/SGOT 21 unit/L    ALT/SGPT 18 unit/L    Protein Total 7.2 gm/dL    Albumin Level 4.5 gm/dL    Globulin 2.7    A/G Ratio 1.7    Cholesterol 198 mg/dL    Non-  HI    HDL 45 mg/dL    Chol/HDL Ratio 4.4      HI    Triglyceride 104 mg/dL    WBC 8.0    RBC 5.22    Hgb 16.2 gm/dL    Hct 47.3 %    MCV 90.6 fL    MCH 31.0 pg    MCHC 34.2 gm/dL    RDW 13.1 %    Platelet 295    MPV 10.1 fL   .

## 2022-02-15 NOTE — TELEPHONE ENCOUNTER
Entered by Clarita Espinoza CMA on May 11, 2020 10:33:21 AM CDT  ---------------------  From: Clarita Espinoza CMA   To: [facility]    Sent: 5/11/2020 10:33:21 AM CDT  Subject: Medication Management     ** Submitted: **  Order:hydrochlorothiazide-triamterene (hydrochlorothiazide-triamterene 25 mg-37.5 mg oral tablet)  1 tab(s)  Oral  daily  Qty:  90 tab(s)        Refills:  1          Substitutions Allowed     Route To [facility]    Signed by Clarita Espinoza CMA  5/11/2020 3:33:00 PM    ** Submitted: **  Complete:hydrochlorothiazide-triamterene (hydrochlorothiazide-triamterene 25 mg-37.5 mg oral tablet)   Signed by Clarita Espinoza CMA  5/11/2020 3:33:00 PM    ** Not Approved:  **  hydrochlorothiazide-triamterene (TRIAMT/HCTZ 37.5-25)  TAKE ONE (1) TABLET DAILY  Qty:  90 tab(s)        Days Supply:  90        Refills:  0          Substitutions Allowed     Route To [facility]   Signed by Clarita Espinoza CMA            ** Patient matched by Clarita Espinoza CMA on 5/11/2020 10:32:06 AM CDT **      ------------------------------------------  From: [facility]  To: Osman Courtney MD  Sent: May 11, 2020 9:40:21 AM CDT  Subject: Medication Management  Due: May 12, 2020 9:40:21 AM CDT    ** On Hold Pending Signature **  Drug: hydrochlorothiazide-triamterene (hydrochlorothiazide-triamterene 25 mg-37.5 mg oral tablet)  TAKE ONE (1) TABLET DAILY  Quantity: 90 tab(s)  Days Supply: 90  Refills: 0  Substitutions Allowed  Notes from Pharmacy:     Dispensed Drug: hydrochlorothiazide-triamterene (hydrochlorothiazide-triamterene 25 mg-37.5 mg oral tablet)  TAKE ONE (1) TABLET DAILY  Quantity: 90 tab(s)  Days Supply: 90  Refills: 0  Substitutions Allowed  Notes from Pharmacy:   ------------------------------------------

## 2022-02-15 NOTE — TELEPHONE ENCOUNTER
Entered by Clarita Espinoza CMA on February 11, 2019 9:53:50 AM CST  ---------------------  From: Clarita Espinoza CMA   To: [facility]    Sent: 2/11/2019 9:53:50 AM CST  Subject: Medication Management     ** Submitted: **  Order:hydrochlorothiazide-triamterene (hydrochlorothiazide-triamterene 25 mg-37.5 mg oral tablet)  1 tab(s)  Oral  daily  Pt due for appt  Qty:  30 tab(s)        Refills:  0          CHERY     Route To [facility]    Signed by Clarita Espinoza CMA  2/11/2019 9:53:00 AM    ** Submitted: **  Complete:hydrochlorothiazide-triamterene (hydrochlorothiazide-triamterene 25 mg-37.5 mg oral tablet)   Signed by Clarita Espinoza CMA  2/11/2019 9:53:00 AM    ** Not Approved:  **  hydrochlorothiazide-triamterene (Triamterene-HCTZ Tablet 37.5-25 MG)  Take one (1) tablet daily  Qty:  90 EA        Days Supply:  0        Refills:  0          CHERY     Route To [facility]   Signed by Clarita Espinoza CMA            ** Patient matched by Clarita Espinoza CMA on 2/11/2019 9:52:46 AM CST **      ------------------------------------------  From: [facility]  To: Osman Courtney MD  Sent: February 9, 2019 11:23:33 AM CST  Subject: Medication Management  Due: February 10, 2019 11:23:33 AM CST    ** On Hold Pending Signature **  Drug: hydrochlorothiazide-triamterene (hydrochlorothiazide-triamterene 25 mg-37.5 mg oral tablet)  Take one (1) tablet daily  Quantity: 90 EA       Days Supply: 0         Refills: 0  Substitutions Allowed  Notes from Pharmacy:     Dispensed Drug: hydrochlorothiazide-triamterene (hydrochlorothiazide-triamterene 25 mg-37.5 mg oral tablet)  Take one (1) tablet daily  Quantity: 90 EA       Days Supply: 0         Refills: 0  Substitutions Allowed  Notes from Pharmacy:   ------------------------------------------

## 2022-02-15 NOTE — NURSING NOTE
Comprehensive Intake Entered On:  5/2/2019 3:14 PM CDT    Performed On:  5/2/2019 3:09 PM CDT by Mercy Friedman               Summary   Weight Measured :   226.8 lb(Converted to: 226 lb 13 oz, 102.87 kg)    Body Mass Index :   33.49 kg/m2 (HI)    Body Surface Area :   2.24 m2   Mercy Friedman - 5/2/2019 3:15 PM CDT   Chief Complaint :   Pt here for a cough starting two days ago.    Mercy Friedman - 5/2/2019 3:16 PM CDT     Height Measured :   69 in(Converted to: 5 ft 9 in, 175.26 cm)    Systolic Blood Pressure :   132 mmHg (HI)    Diastolic Blood Pressure :   82 mmHg (HI)    Mean Arterial Pressure :   99 mmHg   Peripheral Pulse Rate :   78 bpm   BP Site :   Left arm   BP Method :   Manual   HR Method :   Electronic   Temperature Tympanic :   97.2 DegF(Converted to: 36.2 DegC)  (LOW)    Oxygen Saturation :   98 %   Mercy Friedman - 5/2/2019 3:09 PM CDT   Health Status   Allergies Verified? :   Yes   Medication History Verified? :   Yes   Pre-Visit Planning Status :   Not completed   Tobacco Use? :   Former smoker   Mercy Friedman - 5/2/2019 3:09 PM CDT   Consents   Consent for Immunization Exchange :   Consent Granted   Consent for Immunizations to Providers :   Consent Granted   Mercy Friedman - 5/2/2019 3:09 PM CDT   Meds / Allergies   (As Of: 5/2/2019 3:14:08 PM CDT)   Allergies (Active)   No known allergies  Estimated Onset Date:   Unspecified ; Created By:   Viv Rick CMA; Reaction Status:   Active ; Category:   Drug ; Substance:   No known allergies ; Type:   Allergy ; Updated By:   Viv Rick CMA; Reviewed Date:   5/2/2019 3:10 PM CDT        Medication List   (As Of: 5/2/2019 3:14:08 PM CDT)   Prescription/Discharge Order    hydrochlorothiazide-triamterene  :   hydrochlorothiazide-triamterene ; Status:   Prescribed ; Ordered As Mnemonic:   hydrochlorothiazide-triamterene 25 mg-37.5 mg oral tablet ; Simple Display Line:   1 tab(s), Oral, daily, 90 tab(s), 1 Refill(s) ;  Ordering Provider:   Osman Courtney MD; Catalog Code:   hydrochlorothiazide-triamterene ; Order Dt/Tm:   4/8/2019 12:20:00 PM          raNITIdine  :   raNITIdine ; Status:   Prescribed ; Ordered As Mnemonic:   raNITIdine 75 mg oral tablet ; Simple Display Line:   75 mg, 1 tab(s), PO, daily, 90 tab(s), 1 Refill(s) ; Ordering Provider:   Osman Courtney MD; Catalog Code:   raNITIdine ; Order Dt/Tm:   4/2/2019 9:04:13 AM          atorvastatin  :   atorvastatin ; Status:   Prescribed ; Ordered As Mnemonic:   atorvastatin 40 mg oral tablet ; Simple Display Line:   20 mg, 0.5 tab(s), PO, Daily, 90 tab(s), 1 Refill(s) ; Ordering Provider:   Osman Courtney MD; Catalog Code:   atorvastatin ; Order Dt/Tm:   4/2/2019 8:58:30 AM          fluorouracil 5% topical cream  :   fluorouracil 5% topical cream ; Status:   Prescribed ; Ordered As Mnemonic:   fluorouracil 5% topical cream ; Simple Display Line:   See Instructions, as directed by physician, 40 gm ; Ordering Provider:   Osman Courtney MD; Catalog Code:   fluorouracil topical ; Order Dt/Tm:   6/12/2018 4:10:04 PM          aspirin  :   aspirin ; Status:   Prescribed ; Ordered As Mnemonic:   aspirin 81 mg oral tablet ; Simple Display Line:   81 mg, 1 tab(s), po, daily, 30 tab(s), 11 Refill(s) ; Ordering Provider:   Osman Courtney MD; Catalog Code:   aspirin ; Order Dt/Tm:   1/8/2018 8:43:49 AM

## 2022-02-15 NOTE — NURSING NOTE
Medicare Visit Entered On:  2/10/2021 7:16 AM CST    Performed On:  2/10/2021 7:13 AM CST by Clarita Espinoza CMA               Summary   Weight Measured :   235 lb(Converted to: 235 lb 0 oz, 106.594 kg)    Body Mass Index :   34.7 kg/m2 (HI)    Body Surface Area :   2.28 m2   Systolic Blood Pressure :   122 mmHg   Diastolic Blood Pressure :   74 mmHg   Mean Arterial Pressure :   90 mmHg   Peripheral Pulse Rate :   72 bpm   Oxygen Saturation :   97 %   Clarita Espinoza CMA - 2/10/2021 7:24 AM CST   Chief Complaint :   Pt here for AWV..visit details reviewed   Height Measured :   69 in(Converted to: 5 ft 9 in, 175.26 cm)    Clarita Espinoza CMA - 2/10/2021 7:13 AM CST   Health Status   Allergies Verified? :   Yes   Medication History Verified? :   Yes   Medical History Verified? :   Yes   Pre-Visit Planning Status :   Completed   Tobacco Use? :   Former smoker   Clarita Espinoza CMA - 2/10/2021 7:13 AM CST   Consents   Consent for Immunization Exchange :   Consent Granted   Consent for Immunizations to Providers :   Consent Granted   Clarita Espinoza CMA - 2/10/2021 7:13 AM CST   Meds / Allergies   (As Of: 2/10/2021 7:26:39 AM CST)   Allergies (Active)   No Known Medication Allergies  Estimated Onset Date:   Unspecified ; Created By:   Michelle Butler CMA; Reaction Status:   Active ; Category:   Drug ; Substance:   No Known Medication Allergies ; Type:   Allergy ; Updated By:   Michelle Butler CMA; Reviewed Date:   2/10/2021 7:25 AM CST        Medication List   (As Of: 2/10/2021 7:26:39 AM CST)   Prescription/Discharge Order    aspirin  :   aspirin ; Status:   Prescribed ; Ordered As Mnemonic:   aspirin 81 mg oral tablet ; Simple Display Line:   81 mg, 1 tab(s), po, daily, 30 tab(s), 11 Refill(s) ; Ordering Provider:   Osman Courtney MD; Catalog Code:   aspirin ; Order Dt/Tm:   1/8/2018 8:43:49 AM CST          atorvastatin  :   atorvastatin ; Status:   Prescribed ; Ordered As Mnemonic:   atorvastatin 40 mg oral tablet ;  Simple Display Line:   20 mg, 0.5 tab(s), PO, Daily, 90 tab(s), 1 Refill(s) ; Ordering Provider:   Osman Courtney MD; Catalog Code:   atorvastatin ; Order Dt/Tm:   7/7/2020 10:49:34 AM CDT          famotidine  :   famotidine ; Status:   Prescribed ; Ordered As Mnemonic:   famotidine 40 mg oral tablet ; Simple Display Line:   40 mg, 1 tab(s), Oral, hs, 90 tab(s), 0 Refill(s) ; Ordering Provider:   Osman Courtney MD; Catalog Code:   famotidine ; Order Dt/Tm:   11/7/2019 10:21:51 AM CST          fluorouracil 5% topical cream  :   fluorouracil 5% topical cream ; Status:   Prescribed ; Ordered As Mnemonic:   fluorouracil 5% topical cream ; Simple Display Line:   See Instructions, as directed by physician, 40 gm ; Ordering Provider:   Osman Courtney MD; Catalog Code:   fluorouracil topical ; Order Dt/Tm:   6/12/2018 4:10:04 PM CDT          hydrochlorothiazide-triamterene  :   hydrochlorothiazide-triamterene ; Status:   Prescribed ; Ordered As Mnemonic:   hydrochlorothiazide-triamterene 25 mg-37.5 mg oral tablet ; Simple Display Line:   1 tab(s), Oral, daily, 90 tab(s), 1 Refill(s) ; Ordering Provider:   Osman Courtney MD; Catalog Code:   hydrochlorothiazide-triamterene ; Order Dt/Tm:   7/7/2020 10:49:27 AM CDT            Social History   Social History   (As Of: 2/10/2021 7:16:21 AM CST)   Alcohol:  Low Risk      (Last Updated: 3/28/2012 10:21:56 AM CDT by Darryl Stark MD )         Tobacco:  Past      Former smoker, quit more than 30 days ago   (Last Updated: 2/10/2021 7:14:54 AM CST by Clarita Espinoza CMA)          Electronic Cigarette/Vaping:        Electronic Cigarette Use: Never.   (Last Updated: 2/10/2021 7:14:54 AM CST by Clarita Espinoza CMA)          Substance Abuse:  Denies Substance Abuse      (Last Updated: 3/28/2012 10:21:58 AM CDT by Darryl Stark MD )         Employment/School:        Retired   Comments:  7/3/2018 9:05 AM - Sherwin IGLESIAS, Osman: Retired 3/28/2012 10:35 AM - Mi IGLESIAS, Darryl: cheli   (Zoltan  Updated: 7/3/2018 9:05:37 AM CDT by Osman Courtney MD)          Home/Environment:        Marital status: .   (Last Updated: 3/28/2012 2:41:20 PM CDT by Andrés Morse CMA)          Exercise:  Does not exercise      (Last Updated: 3/28/2012 2:40:58 PM CDT by Andrés Morse CMA )         Sexual:        Sexually active: Yes.  Sexual orientation: Heterosexual.   (Last Updated: 3/28/2012 2:40:46 PM CDT by Andrés Morse CMA)            Geriatric Depression Screening   Geriatric Depression Satisfied Life :   Yes   Geriatric Depression Dropped Activities :   No   Geriatric Depression Life Empty :   No   Geriatric Depression Bored :   No   Geriatric Depression Good Spirits :   Yes   Geriatric Depression Afraid Bad Things :   No   Geriatric Depression Feel Happy :   Yes   Geriatric Depression Feel Helpless :   No   Geriatric Depression Prefer to Stay Home :   Yes   Geriatric Depression Memory Problems :   No   Geriatric Depression Wonderful Be Alive :   Yes   Geriatric Depression Feel Worthless :   No   Geriatric Depression Situation Hopeless :   No   Geriatric Depression Others Better Off :   No   Geriatric Depression Full of Energy :   Yes   Geriatric Depression Total Score :   1    Clarita Espinoza CMA - 2/10/2021 7:13 AM CST   Hearing and Vision Screening   Audiogram Result Right Ear :   Fail   Audiogram Result Left Ear :   Fail   Hearing Screen Comments :   has hearing aides   Clarita Espinoza CMA - 2/10/2021 7:24 AM CST   Home Safety Screen   Emergency Numbers Kept/Updated :   Yes   Aware of Smoking Dangers :   Yes   Smoke Alarms/Fire Extinguisher Available :   Yes   Household Members Fire Safety Knowledge :   Yes   Floor Rugs Removed or Fastened :   Yes   Mats in Bathtub/Shower :   Yes   Stairway Rails or Banisters :   Yes   Outdoor Clutter Safety :   Yes   Indoor Clutter Safety :   Yes   Electrical Cord Safety :   Yes   Clarita Espinoza CMA 2/10/2021 7:13 AM CST   Advance Directives   Advance Directive :    No   Clarita Espinoza CMA 2/10/2021 7:24 AM CST   Rizzo Fall Risk   History of Fall in Last 3 Months Rizzo :   No   Clarita Espinoza CMA 2/10/2021 7:13 AM CST   Functional Assessment   Focused Functional Assessment Grid   Bathing :   Independent (2)   Dressing :   Independent (2)   Toileting :   Independent (2)   Transferring Bed or Chair :   Independent (2)   Continence :   Independent (2)   Feeding :   Independent (2)   Clarita Espinoza CMA 2/10/2021 7:13 AM CST   ADL Index Score :   12    Capable of Shopping :   Yes   Capable of Walking :   Yes   Capable of Housekeeping :   Yes   Capable of Managing Medications :   Yes   Capable of Handling Finances :   Yes   Clarita Espinoza CMA 2/10/2021 7:13 AM CST

## 2022-02-15 NOTE — PROGRESS NOTES
Patient:   ABELARDO TINOCO            MRN: [id number]            FIN: [id number]               Age:   69 years     Sex:  Male     :  1950   Associated Diagnoses:   HLD (hyperlipidemia); Benign essential HTN; Chronic GERD   Author:   Osman Courtney MD      Impression and Plan   Diagnosis     HLD (hyperlipidemia) (JNA94-TT E78.5).     Course:  Progressing as expected.    Orders     Orders   Charges (Evaluation and Management):  [id number] office outpatient visit 25 minutes (Charge) (Order): Quantity: 1, HLD (hyperlipidemia)  Benign essential HTN  Chronic GERD.     Orders (Selected)   Prescriptions  Prescribed  atorvastatin 40 mg oral tablet: = 0.5 tab(s) ( 20 mg ), PO, Daily, # 90 tab(s), 1 Refill(s), Type: Maintenance, Pharmacy: [facility], 0.5 tab(s) Oral daily.     Diagnosis     Benign essential HTN (GCI40-AO I10).     Course:  Well controlled.    Orders     Orders (Selected)   Prescriptions  Prescribed  hydrochlorothiazide-triamterene 25 mg-37.5 mg oral tablet: 1 tab(s), Oral, daily, # 90 tab(s), 1 Refill(s), Type: Maintenance, Pharmacy: [facility], 1 tab(s) Oral daily.     Diagnosis     Chronic GERD (UEU22-NX K21.9).     Course:  Progressing as expected.    Orders     Orders (Selected)   Prescriptions  Prescribed  famotidine 40 mg oral tablet: = 1 tab(s) ( 40 mg ), Oral, hs, # 90 tab(s), 0 Refill(s), Type: Maintenance, OTC (Rx).        Visit Information      Date of Service: 2019 09:45 am  Performing Location: [facility]  Encounter#: [id number]      Primary Care Provider (PCP):  Osman Courtney MD    NPI# [id number]   Visit type:  Scheduled follow-up.    Accompanied by:  Spouse.    Source of history:  Self, Spouse.    Referral source:  Self.    History limitation:  None.       Chief Complaint   2019 9:48 AM CST    Pt here for med ck        History of Present Illness             The patient presents for follow-up evaluation of hypertension.  The quality of hypertension  symptom(s) since the patient's last visit is described as being unchanged.  The severity of the hypertension symptom(s) since the last visit is moderate.  Since the patient's last visit, the timing/course of hypertension symptom(s) is constant.  Exacerbating factors consist of none.  Relieving factors consist of medication.  Associated symptoms consist of none.  Prior treatment consists of lifestyle modification (weight reduction, dietary sodium restriction, increased physical activity, adoption of DASH eating plan).  Medical encounters: none.  Compliance problems: none.        Interval History   Cholesterol Management   Total cholesterol results < 200 mg/dL (optimal).  LDL cholesterol results < 100 mg/dL (optimal).  Triglyceride results < 150 mg/dL (normal).  The course is progressing as expected.  The effect on daily activities is no change in activity level and no change in eating habits.  Associated symptoms characterized by no fatigue, chest pain, joint pain, muscle weakness or myalgias.     Reflux Esophagitis   The course is progressing as expected.  Compliance problems: none.  The effect on daily activities is no change in eating habits, no change in sleeping patterns and no change in work/school duties.  There were disease related encounters including none.  Associated symptoms characterized by no weight loss, chest pain, wheeze, epigastric pain, hematemesis, loss of appetite or nausea.        Review of Systems   Constitutional:  Negative.    Eye:  Negative.    Ear/Nose/Mouth/Throat:  Negative.    Respiratory:  Negative.    Cardiovascular:  Negative.    Gastrointestinal:  Negative.    Genitourinary:  Negative.    Hematology/Lymphatics:  Negative.    Endocrine:  Negative.    Immunologic:  Negative.    Musculoskeletal:  Joint pain.    Integumentary:  Negative.    Neurologic:  Negative.    Psychiatric:  Negative.    All other systems reviewed and negative      Health Status   Allergies:    Allergic Reactions  (Selected)  No known allergies   Medications:  (Selected)   Prescriptions  Prescribed  aspirin 81 mg oral tablet: 1 tab(s) ( 81 mg ), po, daily, # 30 tab(s), 11 Refill(s), Type: Maintenance, patient has supply at home (Rx)  atorvastatin 40 mg oral tablet: = 0.5 tab(s) ( 20 mg ), PO, Daily, # 90 tab(s), 1 Refill(s), Type: Maintenance, Pharmacy: [facility], 0.5 tab(s) Oral daily  famotidine 40 mg oral tablet: = 1 tab(s) ( 40 mg ), Oral, hs, # 90 tab(s), 0 Refill(s), Type: Maintenance, OTC (Rx)  fluorouracil 5% topical cream: See Instructions, Instructions: as directed by physician, # 40 gm, Type: Soft Stop, Pharmacy: [facility], as directed by physician  hydrochlorothiazide-triamterene 25 mg-37.5 mg oral tablet: 1 tab(s), Oral, daily, # 90 tab(s), 1 Refill(s), Type: Maintenance, Pharmacy: [facility], 1 tab(s) Oral daily   Problem list:    All Problems (Selected)  Adenomatous polyp of colon / SNOMED CT 3173678982 / Confirmed  Benign essential HTN / SNOMED CT 2151002 / Confirmed  Chronic GERD / SNOMED CT 5021507022 / Confirmed  Obesity / ICD-9-.00 / Probable  Other allergic rhinitis / SNOMED CT 170623452 / Confirmed  Tear of meniscus of right knee / SNOMED CT 846327146 / Confirmed      Histories   Past Medical History:    Resolved  Familial Hyperlipidemia (272.4):  Resolved.   Family History:    Cancer  Mother ()  Grandmother (M)     Procedure history:    Bucket handle tear of lateral meniscus of knee (SNOMED CT 147776546) performed by Malik Sumner DO on 2018 at 67 Years.  Comments:  2018 2:08 PM CDT - Elizabeth Armstrong.  Colonoscopy (SNOMED CT 033201378) performed by Ty Somers MD on 2018 at 67 Years.  Comments:  2018 3:34 PM CST - Ty Somers MD  Indication: Screening  Sedation: Fentanyl 100 mcg, Versed 2 mg  Findings: One tubular adenoma, diverticulosis  Rec: Repeat in 5 years  Tonsillectomy and adenoidectomy (SNOMED CT 592242855).   Social  History:        Alcohol Assessment: Low Risk      Tobacco Assessment: Past                     Comments:                      03/28/2012 - Noblesarahkami TACO, Viv                     Quit x 20 yrs      Substance Abuse Assessment: Denies Substance Abuse      Employment and Education Assessment            Retired                     Comments:                      07/03/2018 - Sherwin IGLESIAS, Osman                     Retired                       03/28/2012 - Mi IGLESIAS, Darryl bower      Home and Environment Assessment            Marital status: .      Exercise and Physical Activity Assessment: Does not exercise      Sexual Assessment            Sexually active: Yes.  Sexual orientation: Heterosexual.        Physical Examination   Vital Signs   11/7/2019 9:48 AM CST Peripheral Pulse Rate 71 bpm    Systolic Blood Pressure 126 mmHg    Diastolic Blood Pressure 80 mmHg    Mean Arterial Pressure 95 mmHg    Oxygen Saturation 97 %      Measurements from flowsheet : Measurements   11/7/2019 9:48 AM CST Height Measured - Standard 69 in    Weight Measured - Standard 226 lb    BSA 2.23 m2    Body Mass Index 33.37 kg/m2  HI      General:  No acute distress.    Neck:  Supple, No lymphadenopathy, No thyromegaly.    Respiratory:  Lungs are clear to auscultation, Respirations are non-labored, Breath sounds are equal, Symmetrical chest wall expansion.    Cardiovascular:  Normal rate, Regular rhythm, No murmur, No gallop, Good pulses equal in all extremities, Normal peripheral perfusion, No edema.    Gastrointestinal:  Soft, Non-tender, Non-distended, Normal bowel sounds, No organomegaly.    Integumentary:  Warm, Dry, Pink.    Neurologic:  Alert, Oriented.    Psychiatric:  Cooperative, Appropriate mood & affect.       Review / Management   Results review:  Lab results   10/23/2019 8:41 AM CDT Sodium Level 140 mmol/L    Potassium Level 4.2 mmol/L    Chloride Level 104 mmol/L    CO2 Level 26 mmol/L    Glucose Level 85  mg/dL    BUN 20 mg/dL    Creatinine Level 1.21 mg/dL    BUN/Creat Ratio NOT APPLICABLE    eGFR 62 mL/min/1.73m2    eGFR African American 71 mL/min/1.73m2    Calcium Level 9.4 mg/dL    Bilirubin Total 0.5 mg/dL    Alkaline Phosphatase 77 unit/L    AST/SGOT 20 unit/L    ALT/SGPT 16 unit/L    Protein Total 6.7 gm/dL    Albumin Level 4.1 gm/dL    Globulin 2.6    A/G Ratio 1.6    Cholesterol 176 mg/dL    Non-HDL Cholesterol 126    HDL 50 mg/dL    Cholesterol/HDL Ratio 3.5      HI    Triglyceride 113 mg/dL    WBC 7.0    RBC 4.98    Hgb 15.7 gm/dL    Hct 44.5 %    MCV 89.4 fL    MCH 31.5 pg    MCHC 35.3 gm/dL    RDW 12.8 %    Platelet 279    MPV 10.1 fL   .

## 2022-02-15 NOTE — PROGRESS NOTES
Patient:   ABELARDO TINOCO            MRN: [id number]            FIN: [id number]               Age:   67 years     Sex:  Male     :  1950   Associated Diagnoses:   Familial Hyperlipidemia; Benign essential HTN; Chronic GERD; Pain in right knee   Author:   Osman Courtney MD      Impression and Plan   Diagnosis     Familial Hyperlipidemia (NJQ50-GQ E78.4).     Course:  Progressing as expected.    Orders     Orders   Charges (Evaluation and Management):  44486 office outpatient visit 25 minutes (Charge) (Order): Quantity: 1, Familial Hyperlipidemia  Benign essential HTN  Pain in right knee  Chronic GERD.     Orders (Selected)   Prescriptions  Prescribed  atorvastatin 40 mg oral tablet: 1 tab(s) ( 40 mg ), PO, Daily, # 90 tab(s), 1 Refill(s), Type: Maintenance.     Diagnosis     Benign essential HTN (YTW43-DA I10).     Course:  Well controlled.    Orders     Orders (Selected)   Prescriptions  Prescribed  hydrochlorothiazide-triamterene 25 mg-37.5 mg oral tablet: 1 tab(s), po, daily, # 90 tab(s), 1 Refill(s), Type: Maintenance, Pharmacy: [facility], 1 tab(s) po daily.     Diagnosis     Pain in right knee (OHM92-PI M25.561).     Orders     Orders (Selected)   Outpatient Orders  Ordered  Referral (Request): 18 10:10:00 CDT, Referred to: Orthopaedics, Referred to: Dr. Wood [facility], Pain in right knee.     Diagnosis     Chronic GERD (ATU14-UB K21.9).     Course:  Progressing as expected.    Orders     Orders (Selected)   Prescriptions  Prescribed  raNITIdine 75 mg oral tablet: 1 tab(s) ( 75 mg ), PO, daily, # 30 tab(s), 11 Refill(s), Type: Maintenance, Pharmacy: [facility], 1 tab(s) po daily,x30 day(s).        Visit Information      Date of Service: 2018 09:48 am  Performing Location: [facility]  Encounter#: [id number]      Primary Care Provider (PCP):  Osman Courtney MD    NPI# [id number]   Visit type:  Scheduled follow-up.    Accompanied by:  No one.    Source of  history:  Self.    Referral source:  Self.    History limitation:  None.       Chief Complaint   5/29/2018 9:51 AM CDT    Pt here for med ck        History of Present Illness             The patient presents for follow-up evaluation of hypertension.  The quality of hypertension symptom(s) since the patient's last visit is described as being unchanged.  The severity of the hypertension symptom(s) since the last visit is moderate.  Since the patient's last visit, the timing/course of hypertension symptom(s) is constant.  Exacerbating factors consist of none.  Relieving factors consist of medication.  Associated symptoms consist of none.  Prior treatment consists of lifestyle modification (weight reduction, dietary sodium restriction, increased physical activity, adoption of DASH eating plan).  Medical encounters: none.  Compliance problems: none.               The patient presents with a knee problem.  The location of the knee problem is generalized, the right knee.  The knee problem is characterized by popping and pain.  The severity of the knee problem is severe.  The timing/course of symptom(s) associated with the knee problem is constant.  The knee problem has lasted for 1 week(s).  Radiation of pain: none.  There are no modifying factors.  Associated symptoms consist of none.        Interval History   Cholesterol Management   Total cholesterol results < 200 mg/dL (optimal).  LDL cholesterol results < 100 mg/dL (optimal).  Triglyceride results < 150 mg/dL (normal).  The course is progressing as expected.  The effect on daily activities is no change in activity level and no change in eating habits.  Associated symptoms characterized by no fatigue, chest pain, joint pain, muscle weakness or myalgias.     Reflux Esophagitis   The course is progressing as expected.  Compliance problems: none.  The effect on daily activities is no change in eating habits, no change in sleeping patterns and no change in work/school  duties.  There were disease related encounters including none.  Associated symptoms characterized by no weight loss, chest pain, wheeze, epigastric pain, hematemesis, loss of appetite or nausea.        Review of Systems   Constitutional:  Negative.    Eye:  Negative.    Ear/Nose/Mouth/Throat:  Negative.    Respiratory:  Negative.    Cardiovascular:  Negative.    Gastrointestinal:  Negative.    Genitourinary:  Negative.    Hematology/Lymphatics:  Negative.    Endocrine:  Negative.    Immunologic:  Negative.    Musculoskeletal:  Joint pain.    Integumentary:  Negative.    Neurologic:  Negative.    Psychiatric:  Negative.    All other systems reviewed and negative      Health Status   Allergies:    Allergic Reactions (Selected)  No known allergies   Medications:  (Selected)   Prescriptions  Prescribed  aspirin 81 mg oral tablet: 1 tab(s) ( 81 mg ), po, daily, # 30 tab(s), 11 Refill(s), Type: Maintenance, patient has supply at home (Rx)  atorvastatin 40 mg oral tablet: 1 tab(s) ( 40 mg ), PO, Daily, # 90 tab(s), 1 Refill(s), Type: Maintenance  hydrochlorothiazide-triamterene 25 mg-37.5 mg oral tablet: 1 tab(s), po, daily, # 90 tab(s), 1 Refill(s), Type: Maintenance, Pharmacy: [facility], 1 tab(s) po daily  raNITIdine 75 mg oral tablet: 1 tab(s) ( 75 mg ), PO, daily, # 30 tab(s), 11 Refill(s), Type: Maintenance, Pharmacy: [facility], 1 tab(s) po daily,x30 day(s)   Problem list:    All Problems (Selected)  Adenomatous polyp of colon / SNOMED CT 9607265897 / Confirmed  Benign essential HTN / SNOMED CT 0136654 / Confirmed  Chronic GERD / SNOMED CT 2124375449 / Confirmed  Obesity / ICD-9-.00 / Probable  Other allergic rhinitis / SNOMED CT 053319964 / Confirmed      Histories   Past Medical History:    Resolved  Familial Hyperlipidemia (272.4):  Resolved.   Family History:    Cancer  Mother ()  Grandmother (M)     Procedure history:    Colonoscopy (SNOMED CT 398687210) performed by Yonis IGLESIAS,  Ty on 2/14/2018 at 67 Years.  Comments:  2/16/2018 3:34 PM - Ty Somers MD  Indication: Screening  Sedation: Fentanyl 100 mcg, Versed 2 mg  Findings: One tubular adenoma, diverticulosis  Rec: Repeat in 5 years   Social History:        Alcohol Assessment: Low Risk      Tobacco Assessment: Past                     Comments:                      03/28/2012 - Merline ESPAÑA, Viv                     Quit x 20 yrs      Substance Abuse Assessment: Denies Substance Abuse      Employment and Education Assessment            Employed                     Comments:                      03/28/2012 - Mi IGLESIAS, Darryl bower      Home and Environment Assessment            Marital status: .      Exercise and Physical Activity Assessment: Does not exercise      Sexual Assessment            Sexually active: Yes.  Sexual orientation: Heterosexual.        Physical Examination   Vital Signs   5/29/2018 9:51 AM CDT Peripheral Pulse Rate 65 bpm    Systolic Blood Pressure 126 mmHg    Diastolic Blood Pressure 72 mmHg    Mean Arterial Pressure 90 mmHg    BP Site Left arm    Oxygen Saturation 97 %      Measurements from flowsheet : Measurements   5/29/2018 9:51 AM CDT Height Measured - Standard 69 in    Weight Measured - Standard 228.2 lb    BSA 2.24 m2    Body Mass Index 33.7 kg/m2  HI      General:  No acute distress.    Neck:  Supple, No lymphadenopathy, No thyromegaly.    Respiratory:  Lungs are clear to auscultation, Respirations are non-labored, Breath sounds are equal, Symmetrical chest wall expansion.    Cardiovascular:  Normal rate, Regular rhythm, No murmur, No gallop, Good pulses equal in all extremities, Normal peripheral perfusion, No edema.    Gastrointestinal:  Soft, Non-tender, Non-distended, Normal bowel sounds, No organomegaly.    Musculoskeletal:       Lower extremity exam: Knee ( Right, No deformity, No erythema, No ecchymosis, No effusion, No mass, No nodule, No swelling, Tenderness,  No wound, No numbness, Range of motion ( Diminished ), Clayton test ( Positive ( On the right ) ) ).    Integumentary:  Warm, Dry, Pink.    Neurologic:  Alert, Oriented.    Psychiatric:  Cooperative, Appropriate mood & affect.       Review / Management   Results review:  Lab results   5/24/2018 11:52 AM CDT Sodium Level 141 mmol/L    Potassium Level 4.2 mmol/L    Chloride Level 106 mmol/L    CO2 Level 25 mmol/L    Glucose Level 86 mg/dL    BUN 30 mg/dL  HI    Creatinine Level 1.24 mg/dL    BUN/Creat Ratio 24  HI    eGFR 61 mL/min/1.73m2    eGFR African American 70 mL/min/1.73m2    Calcium Level 9.3 mg/dL    Bilirubin Total 0.7 mg/dL    Alkaline Phosphatase 80 unit/L    AST/SGOT 21 unit/L    ALT/SGPT 18 unit/L    Protein Total 7.2 gm/dL    Albumin Level 4.5 gm/dL    Globulin 2.7    A/G Ratio 1.7    Cholesterol 198 mg/dL    Non-HDL Cholesterol 153  HI    HDL 45 mg/dL    Cholesterol/HDL Ratio 4.4      HI    Triglyceride 104 mg/dL    WBC 8.0    RBC 5.22    Hgb 16.2 gm/dL    Hct 47.3 %    MCV 90.6 fL    MCH 31.0 pg    MCHC 34.2 gm/dL    RDW 13.1 %    Platelet 295    MPV 10.1 fL   .

## 2022-02-15 NOTE — LETTER
(Inserted Image. Unable to display)   [address] [facility] [address]  October 24, 2019      ABELARDO TINOCO   [place], [place] [id number]        Dear ABELARDO,     Thank you for selecting [facility] for your healthcare needs. Below you will find the results of the recent tests done at our clinic.        All results are within acceptable limits. No treatment changes are recommended at this time.      Result Name Current Result Previous Result Reference Range   Sodium Level (mmol/L)  140 10/23/2019  139 3/27/2019 135 - 146   Potassium Level (mmol/L)  4.2 10/23/2019  3.7 3/27/2019 3.5 - 5.3   Chloride Level (mmol/L)  104 10/23/2019  105 3/27/2019 98 - 110   CO2 Level (mmol/L)  26 10/23/2019  24 3/27/2019 20 - 32   Glucose Level (mg/dL)  85 10/23/2019 ((H)) 143 3/27/2019 65 - 99   BUN (mg/dL)  20 10/23/2019  20 3/27/2019 7 - 25   Creatinine Level (mg/dL)  1.21 10/23/2019  1.23 3/27/2019 0.70 - 1.25   BUN/Creat Ratio  NOT APPLICABLE 10/23/2019  NOT APPLICABLE 3/27/2019 6 - 22   eGFR (mL/min/1.73m2)  62 10/23/2019  61 3/27/2019 > OR = 60 -    eGFR  (mL/min/1.73m2)  71 10/23/2019  70 3/27/2019 > OR = 60 -    Calcium Level (mg/dL)  9.4 10/23/2019  9.0 3/27/2019 8.6 - 10.3   Bilirubin Total (mg/dL)  0.5 10/23/2019  0.7 3/27/2019 0.2 - 1.2   Alkaline Phosphatase (unit/L)  77 10/23/2019  87 3/27/2019 40 - 115   AST/SGOT (unit/L)  20 10/23/2019  25 3/27/2019 10 - 35   ALT/SGPT (unit/L)  16 10/23/2019  17 3/27/2019 9 - 46   Protein Total (gm/dL)  6.7 10/23/2019  6.9 3/27/2019 6.1 - 8.1   Albumin Level (gm/dL)  4.1 10/23/2019  4.3 3/27/2019 3.6 - 5.1   Globulin  2.6 10/23/2019  2.6 3/27/2019 1.9 - 3.7   A/G Ratio  1.6 10/23/2019  1.7 3/27/2019 1.0 - 2.5   Cholesterol (mg/dL)  176 10/23/2019  182 3/27/2019  - <200   Non-HDL Cholesterol  126 10/23/2019 ((H)) 136 3/27/2019  - <130   HDL (mg/dL)  50 10/23/2019  46 3/27/2019 >40 -    Cholesterol/HDL Ratio  3.5 10/23/2019   4.0 3/27/2019  - <5.0   LDL ((H)) 105 10/23/2019 ((H)) 107 3/27/2019    Triglyceride (mg/dL)  113 10/23/2019 ((H)) 177 3/27/2019  - <150   WBC  7.0 10/23/2019  7.3 3/27/2019 3.8 - 10.8   RBC  4.98 10/23/2019  4.91 3/27/2019 4.20 - 5.80   Hgb (gm/dL)  15.7 10/23/2019  15.5 3/27/2019 13.2 - 17.1   Hct (%)  44.5 10/23/2019  43.5 3/27/2019 38.5 - 50.0   MCV (fL)  89.4 10/23/2019  88.6 3/27/2019 80.0 - 100.0   MCH (pg)  31.5 10/23/2019  31.6 3/27/2019 27.0 - 33.0   MCHC (gm/dL)  35.3 10/23/2019  35.6 3/27/2019 32.0 - 36.0   RDW (%)  12.8 10/23/2019  13.1 3/27/2019 11.0 - 15.0   Platelet  279 10/23/2019  291 3/27/2019 140 - 400   MPV (fL)  10.1 10/23/2019  10.4 3/27/2019 7.5 - 12.5       Please contact my practice at [phone]  if you have any questions or concerns.     Sincerely,        Osman Courtney MD          What do your labs mean?  Below is a glossary of commonly ordered labs:  LDL   Bad Cholesterol   HDL   Good Cholesterol  AST/ALT   Liver Function   Cr/Creatinine   Kidney Function  Microalbumin   Kidney Function  BUN   Kidney Function  PSA   Prostate    TSH   Thyroid Hormone  HgbA1c   Diabetes Test   Hgb (Hemoglobin)   Red Blood Cells

## 2022-02-15 NOTE — LETTER
(Inserted Image. Unable to display)   [address] [place] [address]  February 11, 2021      ABELARDO TINOCO   [place], [place] [id number]        Dear ABELARDO,     Thank you for selecting [facility] for your healthcare needs. Below you will find the results of the recent tests done at our clinic.        All results are within acceptable limits. No treatment changes are recommended at this time.      Result Name Current Result Previous Result Reference Range   Sodium Level (mmol/L)  138 2/10/2021  138 7/1/2020 135 - 146   Potassium Level (mmol/L)  4.1 2/10/2021  4.1 7/1/2020 3.5 - 5.3   Chloride Level (mmol/L)  101 2/10/2021  104 7/1/2020 98 - 110   CO2 Level (mmol/L)  30 2/10/2021  24 7/1/2020 20 - 32   Glucose Level (mg/dL)  86 2/10/2021  93 7/1/2020 65 - 99   BUN (mg/dL)  24 2/10/2021  23 7/1/2020 7 - 25   Creatinine Level (mg/dL)  1.24 2/10/2021  1.10 7/1/2020 0.70 - 1.25   BUN/Creat Ratio  NOT APPLICABLE 2/10/2021  NOT APPLICABLE 7/1/2020 6 - 22   eGFR (mL/min/1.73m2) ((L)) 59 2/10/2021  69 7/1/2020 > OR = 60 -    eGFR  (mL/min/1.73m2)  69 2/10/2021  80 7/1/2020 > OR = 60 -    Calcium Level (mg/dL)  9.6 2/10/2021  9.6 7/1/2020 8.6 - 10.3   Bilirubin Total (mg/dL)  0.5 2/10/2021  0.7 7/1/2020 0.2 - 1.2   Alkaline Phosphatase (unit/L)  80 2/10/2021  81 7/1/2020 35 - 144   AST/SGOT (unit/L)  17 2/10/2021  30 7/1/2020 10 - 35   ALT/SGPT (unit/L)  15 2/10/2021  26 7/1/2020 9 - 46   Protein Total (gm/dL)  7.0 2/10/2021  6.9 7/1/2020 6.1 - 8.1   Albumin Level (gm/dL)  4.3 2/10/2021  4.4 7/1/2020 3.6 - 5.1   Globulin  2.7 2/10/2021  2.5 7/1/2020 1.9 - 3.7   A/G Ratio  1.6 2/10/2021  1.8 7/1/2020 1.0 - 2.5   Cholesterol (mg/dL) ((H)) 208 2/10/2021 ((H)) 203 7/1/2020  - <200   Non-HDL Cholesterol ((H)) 159 2/10/2021 ((H)) 152 7/1/2020  - <130   HDL (mg/dL)  49 2/10/2021  51 7/1/2020 > OR = 40 -    Cholesterol/HDL Ratio  4.2 2/10/2021  4.0 7/1/2020  - <5.0   LDL  ((H)) 138 2/10/2021 ((H)) 126 7/1/2020    Triglyceride (mg/dL)  106 2/10/2021  145 7/1/2020  - <150   WBC  6.7 2/10/2021  7.5 7/1/2020 3.8 - 10.8   RBC  5.04 2/10/2021  5.13 7/1/2020 4.20 - 5.80   Hgb (gm/dL)  15.6 2/10/2021  16.0 7/1/2020 13.2 - 17.1   Hct (%)  45.3 2/10/2021  46.8 7/1/2020 38.5 - 50.0   MCV (fL)  89.9 2/10/2021  91.2 7/1/2020 80.0 - 100.0   MCH (pg)  31.0 2/10/2021  31.2 7/1/2020 27.0 - 33.0   MCHC (gm/dL)  34.4 2/10/2021  34.2 7/1/2020 32.0 - 36.0   RDW (%)  13.0 2/10/2021  12.8 7/1/2020 11.0 - 15.0   Platelet  273 2/10/2021  307 7/1/2020 140 - 400   MPV (fL)  9.9 2/10/2021  10.2 7/1/2020 7.5 - 12.5       Please contact my practice at [phone] if you have any questions or concerns.     Sincerely,        Osman Courtney MD          What do your labs mean?  Below is a glossary of commonly ordered labs:  LDL   Bad Cholesterol   HDL   Good Cholesterol  AST/ALT   Liver Function   Cr/Creatinine   Kidney Function  Microalbumin   Kidney Function  BUN   Kidney Function  PSA   Prostate    TSH   Thyroid Hormone  HgbA1c   Diabetes Test   Hgb (Hemoglobin)   Red Blood Cells

## 2022-02-15 NOTE — LETTER
(Inserted Image. Unable to display)   March 04, 2020        ABELARDO ALEJANDRINA   [place], [place] [id number]        Dear ABELARDO,      Thank you for selecting [facility] for your healthcare needs.    Our records indicate you are due for the following services:     Annual Wellness Visit    To schedule an appointment or if you have further questions, please contact your primary clinic:   [facility]       [phone]   [facility]       [phone]              [facility]     [phone]      Powered by [employer]    Sincerely,    Osman Courtney M.D.

## 2022-02-15 NOTE — PROGRESS NOTES
Patient:   ABELARDO TINOCO            MRN: [id number]            FIN: [id number]               Age:   67 years     Sex:  Male     :  1950   Associated Diagnoses:   Familial Hyperlipidemia; Benign essential HTN; Chronic GERD; Cancer screening   Author:   Sherwin IGLESIAS, Osman      Impression and Plan   Diagnosis     Familial Hyperlipidemia (TWH53-JX E78.4).     Course:  Progressing as expected.    Orders     Orders   Charges (Evaluation and Management):  92746 office outpatient visit 25 minutes (Charge) (Order): Quantity: 1, Benign essential HTN  Familial Hyperlipidemia  Cancer screening.     Orders (Selected)   Outpatient Orders  Ordered (Dispatched)  CBC (h/h, RBC, indices, WBC, Plt)* ([facility]): Specimen Type: Blood, Collection Date: 18 8:45:00 CST  Comprehensive Metabolic Panel* ([facility]): Specimen Type: Serum, Collection Date: 18 8:45:00 CST  Lipid panel with reflex to direct ldl* ([facility]): Specimen Type: Serum, Collection Date: 18 8:45:00 CST  Prescriptions  Prescribed  simvastatin 20 mg oral tablet: 1 tab(s) ( 20 mg ), po, hs, # 30 tab(s), 5 Refill(s), Type: Maintenance, patient has supply at home (Rx).     Diagnosis     Benign essential HTN (KEN17-IZ I10).     Course:  Progressing as expected.    Orders     Orders (Selected)   Documented Medications  Documented  hydrochlorothiazide-triamterene 25 mg-37.5 mg oral tablet: 1 tab(s), po, daily, 0 Refill(s), Type: Maintenance.     Diagnosis     Chronic GERD (XNS25-YG K21.9).     Course:  Progressing as expected.    Orders     Orders (Selected)   Prescriptions  Prescribed  raNITIdine 75 mg oral tablet: 1 tab(s) ( 75 mg ), PO, daily, # 30 tab(s), 11 Refill(s), Type: Maintenance, Pharmacy: [facility], 1 tab(s) po daily,x30 day(s).     Diagnosis     Cancer screening (PFF25-KR Z12.9).     Course:  Progressing as expected.    Orders     Orders (Selected)   Outpatient Orders  Ordered  Colonoscopy (Request): Cancer screening  Ordered  (Dispatched)  PSA, Total (Room)* ([facility]): Specimen Type: Serum, Collection Date: 01/08/18 8:46:00 CST.        Visit Information   Visit type:  Scheduled follow-up.    Accompanied by:  No one.    Source of history:  Self.    Referral source:  Self.    History limitation:  None.       Chief Complaint   1/8/2018 8:21 AM CST     Pt here for med ck        History of Present Illness             The patient presents for follow-up evaluation of hypertension.  The quality of hypertension symptom(s) since the patient's last visit is described as being unchanged.  The severity of the hypertension symptom(s) since the last visit is moderate.  Since the patient's last visit, the timing/course of hypertension symptom(s) is constant.  Exacerbating factors consist of none.  Relieving factors consist of medication.  Associated symptoms consist of none.  Prior treatment consists of lifestyle modification (weight reduction, dietary sodium restriction, increased physical activity, adoption of DASH eating plan).  Medical encounters: none.  Compliance problems: none.        Interval History   Cholesterol Management   Total cholesterol results < 200 mg/dL (optimal).  LDL cholesterol results < 100 mg/dL (optimal).  Triglyceride results < 150 mg/dL (normal).  The course is progressing as expected.  The effect on daily activities is no change in activity level and no change in eating habits.  Associated symptoms characterized by no fatigue, chest pain, joint pain, muscle weakness or myalgias.     Reflux Esophagitis   The course is progressing as expected.  Compliance problems: none.  The effect on daily activities is no change in eating habits, no change in sleeping patterns and no change in work/school duties.  There were disease related encounters including none.  Associated symptoms characterized by no weight loss, chest pain, wheeze, epigastric pain, hematemesis, loss of appetite or nausea.        Review of Systems   Constitutional:   Negative.    Eye:  Negative.    Ear/Nose/Mouth/Throat:  Negative.    Respiratory:  Negative.    Cardiovascular:  Negative.    Gastrointestinal:  Negative.    Genitourinary:  Negative.    Hematology/Lymphatics:  Negative.    Endocrine:  Negative.    Immunologic:  Negative.    Musculoskeletal:  Negative.    Integumentary:  Negative.    Neurologic:  Negative.    Psychiatric:  Negative.    All other systems reviewed and negative      Health Status   Allergies:    Allergic Reactions (Selected)  No known allergies   Medications:  (Selected)   Prescriptions  Prescribed  aspirin 81 mg oral tablet: 1 tab(s) ( 81 mg ), po, daily, # 30 tab(s), 11 Refill(s), Type: Maintenance, patient has supply at home (Rx)  raNITIdine 75 mg oral tablet: 1 tab(s) ( 75 mg ), PO, daily, # 30 tab(s), 11 Refill(s), Type: Maintenance, Pharmacy: [facility], 1 tab(s) po daily,x30 day(s)  simvastatin 20 mg oral tablet: 1 tab(s) ( 20 mg ), po, hs, # 30 tab(s), 5 Refill(s), Type: Maintenance, patient has supply at home (Rx)  Documented Medications  Documented  hydrochlorothiazide-triamterene 25 mg-37.5 mg oral tablet: 1 tab(s), po, daily, 0 Refill(s), Type: Maintenance   Problem list:    All Problems (Selected)  Benign essential HTN / SNOMED CT 1937540 / Confirmed  Chronic GERD / SNOMED CT 9160605018 / Confirmed  Obesity / ICD-9-.00 / Probable  Other allergic rhinitis / SNOMED CT 633160968 / Confirmed      Histories   Past Medical History:    Resolved  Familial Hyperlipidemia (272.4):  Resolved.   Family History:    Cancer  Mother ()  Grandmother (M)     Procedure history:    No active procedure history items have been selected or recorded.      Physical Examination   Vital Signs   2018 8:21 AM CST Peripheral Pulse Rate 70 bpm    Systolic Blood Pressure 128 mmHg    Diastolic Blood Pressure 80 mmHg    Mean Arterial Pressure 96 mmHg    BP Site Right arm    Oxygen Saturation 97 %      Measurements from flowsheet : Measurements    1/8/2018 8:21 AM CST Height Measured - Standard 69 in    Weight Measured - Standard 234.6 lb    BSA 2.27 m2    Body Mass Index 34.64 kg/m2  HI      General:  No acute distress.    Neck:  Supple, No lymphadenopathy, No thyromegaly.    Respiratory:  Lungs are clear to auscultation, Respirations are non-labored, Breath sounds are equal, Symmetrical chest wall expansion.    Cardiovascular:  Normal rate, Regular rhythm, No murmur, No gallop, Good pulses equal in all extremities, Normal peripheral perfusion, No edema.    Gastrointestinal:  Soft, Non-tender, Non-distended, Normal bowel sounds, No organomegaly.    Integumentary:  Warm, Dry, Pink.    Neurologic:  Alert, Oriented.    Psychiatric:  Cooperative, Appropriate mood & affect.

## 2022-02-15 NOTE — PROGRESS NOTES
Patient:   ABELARDO TINOCO            MRN: [id number]            FIN: [id number]               Age:   68 years     Sex:  Male     :  1950   Associated Diagnoses:   Acute bronchitis   Author:   Osman Courtney MD      Impression and Plan   Diagnosis     Acute bronchitis (PPI32-SQ J20.9).     Course:  Worsening.    Orders     Orders   Charges (Evaluation and Management):  07267 office outpatient visit 15 minutes (Charge) (Order): Quantity: 1, Acute bronchitis.     Orders (Selected)   Prescriptions  Prescribed  azithromycin 500 mg oral tablet: = 1 tab(s) ( 500 mg ), PO, Daily, # 7 tab(s), 0 Refill(s), Type: Maintenance, Pharmacy: [facility], 1 tab(s) Oral daily,x7 day(s)  predniSONE 20 mg oral tablet: = 1 tab(s) ( 20 mg ), PO, bid, # 10 tab(s), 0 Refill(s), Type: Maintenance, Pharmacy: [facility], 1 tab(s) Oral bid,x5 day(s).        Visit Information      Date of Service: 12/10/2018 10:39 am  Performing Location: [facility]  Encounter#: [id number]      Primary Care Provider (PCP):  Osman Courtney MD    NPI# [id number]   Visit type:  New symptom.    Accompanied by:  No one.    Source of history:  Self.    History limitation:  None.       Chief Complaint   Chief complaint discussed and confirmed correct.     12/10/2018 10:40 AM CST  Pt here for cough        History of Present Illness             The patient presents with cough.  The cough is described as paroxysmal and productive yellow sputum.  The severity of the cough is moderate.  The cough is worsening.  The cough has lasted for 3 day(s).  The context of the cough: occurred in association with illness.  There are no modifying factors.  Associated symptoms consist of shortness of breath.        Review of Systems   Constitutional:  Negative.    Eye:  Negative.    Ear/Nose/Mouth/Throat:  Negative.    Respiratory:  Cough.    Cardiovascular:  Negative.    Gastrointestinal:  Negative.    Genitourinary:  Negative.     Hematology/Lymphatics:  Negative.    Endocrine:  Negative.    Immunologic:  Negative.    Musculoskeletal:  Negative.    Integumentary:  Negative.    Neurologic:  Negative.    Psychiatric:  Negative.    All other systems reviewed and negative      Health Status   Allergies:    Allergic Reactions (Selected)  No known allergies   Medications:  (Selected)   Prescriptions  Prescribed  aspirin 81 mg oral tablet: 1 tab(s) ( 81 mg ), po, daily, # 30 tab(s), 11 Refill(s), Type: Maintenance, patient has supply at home (Rx)  atorvastatin 40 mg oral tablet: 0.5 tab(s) ( 20 mg ), PO, Daily, # 90 tab(s), 1 Refill(s), Type: Maintenance, Pharmacy: [facility]  azithromycin 500 mg oral tablet: = 1 tab(s) ( 500 mg ), PO, Daily, # 7 tab(s), 0 Refill(s), Type: Maintenance, Pharmacy: [facility], 1 tab(s) Oral daily,x7 day(s)  fluorouracil 5% topical cream: See Instructions, Instructions: as directed by physician, # 40 gm, Type: Soft Stop, Pharmacy: [facility], as directed by physician  hydrochlorothiazide-triamterene 25 mg-37.5 mg oral tablet: 1 tab(s), po, daily, # 90 tab(s), 1 Refill(s), Type: Maintenance, Pharmacy: [facility], 1 tab(s) po daily  predniSONE 20 mg oral tablet: = 1 tab(s) ( 20 mg ), PO, bid, # 10 tab(s), 0 Refill(s), Type: Maintenance, Pharmacy: [facility], 1 tab(s) Oral bid,x5 day(s)  raNITIdine 75 mg oral tablet: 1 tab(s) ( 75 mg ), PO, daily, # 30 tab(s), 11 Refill(s), Type: Maintenance, Pharmacy: [facility], 1 tab(s) po daily,x30 day(s)   Problem list:    All Problems  Adenomatous polyp of colon / SNOMED CT 6258103565 / Confirmed  Benign essential HTN / SNOMED CT 8811337 / Confirmed  Chronic GERD / SNOMED CT 2356166029 / Confirmed  Obesity / ICD-9-.00 / Probable  Other allergic rhinitis / SNOMED CT 429770217 / Confirmed  Tear of meniscus of right knee / SNOMED CT 723721011 / Confirmed  Resolved: Familial Hyperlipidemia / ICD-9-.4      Histories   Past  Medical History:    Resolved  Familial Hyperlipidemia (272.4):  Resolved.   Family History:    Cancer  Mother ()  Grandmother (M)     Procedure history:    Bucket handle tear of lateral meniscus of knee (SNOMED CT 317661431) performed by Malik Sumner DO on 2018 at 67 Years.  Comments:  2018 2:08 PM CDT - Patti Elizabeth  Right.  Colonoscopy (SNOMED CT 539345116) performed by Ty Somers MD on 2018 at 67 Years.  Comments:  2018 3:34 PM CST - Ty Somers MD  Indication: Screening  Sedation: Fentanyl 100 mcg, Versed 2 mg  Findings: One tubular adenoma, diverticulosis  Rec: Repeat in 5 years  Tonsillectomy and adenoidectomy (SNOMED CT 789510919).   Social History:        Alcohol Assessment: Low Risk      Tobacco Assessment: Past                     Comments:                      2012 - Viv Rick CMA                     Quit x 20 yrs      Substance Abuse Assessment: Denies Substance Abuse      Employment and Education Assessment            Retired                     Comments:                      2018 - Osman Courtney MD                     Retired                       2012 - Darryl Stark MD      Home and Environment Assessment            Marital status: .      Exercise and Physical Activity Assessment: Does not exercise      Sexual Assessment            Sexually active: Yes.  Sexual orientation: Heterosexual.      Physical Examination   Vital signs reviewed  and within acceptable limits    Vital Signs   12/10/2018 10:40 AM CST Temperature Tympanic 97.5 DegF  LOW    Peripheral Pulse Rate 79 bpm    Systolic Blood Pressure 146 mmHg  HI    Diastolic Blood Pressure 86 mmHg  HI    Mean Arterial Pressure 106 mmHg    BP Site Right arm    Oxygen Saturation 98 %      Measurements from flowsheet : Measurements   12/10/2018 10:40 AM CST Height Measured - Standard 69 in    Weight Measured - Standard 225.2 lb    BSA 2.23 m2    Body Mass  Index 33.25 kg/m2  HI      General:  Alert and oriented, No acute distress.    Eye:  Pupils are equal, round and reactive to light, Extraocular movements are intact, Normal conjunctiva.    HENT:  Normocephalic, Tympanic membranes are clear, Normal hearing, Oral mucosa is moist, No pharyngeal erythema, No sinus tenderness.    Neck:  Supple, Non-tender, No lymphadenopathy, No thyromegaly.    Respiratory:  Lungs are clear to auscultation, Respirations are non-labored, Breath sounds are equal, demonstrates frequent loose cough.    Cardiovascular:  Normal rate, Regular rhythm, No murmur, Normal peripheral perfusion, No edema.    Integumentary:  Warm, Dry, Pink.    Psychiatric:  Cooperative, Appropriate mood & affect, Normal judgment.

## 2022-02-15 NOTE — LETTER
(Inserted Image. Unable to display)     January 11, 2021      ABELARDO MARTINEZSAMUEL   [place], [place] [id number]          Dear ABELARDO,      Thank you for selecting [facility] ([facility]viously [facility] for your healthcare needs.    Our records indicate you are due for the following services:    Hypertension check ~ please remember to bring your at-home blood pressure readings with you to your appointment.     Fasting Lab Tests ~ Please do not eat or drink anything 10 hours prior to your scheduled appointment time.  (Water and any medications that you may need are allowed unless directed otherwise.)    If you had your labs done at another facility or with Direct Access Lab Testing at [facility], please bring in a copy of the results to your next visit, mail a copy, or drop off a copy of your results to your Healthcare Provider.    (FYI   Regarding office visits: In some instances, a video visit or telephone visit may be offered as an option.)    You are due for lab work and an office visit; please schedule the lab appointment 1 week before the office visit.  This will assure all results are available to discuss with your Healthcare Provider during your visit.    **It is very helpful if you bring your medication bottles to your appointment.  This assures we have all of your current medications, including strength and dosing information, documented accurately in your medical record.    To schedule an appointment or if you have further questions, please contact your clinic at [phone].      Powered by [employer]    Sincerely,    Osman Courtney MD

## 2022-02-15 NOTE — NURSING NOTE
Comprehensive Intake Entered On:  4/9/2020 10:34 AM CDT    Performed On:  4/9/2020 10:32 AM CDT by Michelle Butler CMA               Summary   Chief Complaint :   c/o Cough, Left ear ache, ears popping, sore throat since yesterday; verbal consent for telephone visit   Height Measured :   69 in(Converted to: 5 ft 9 in, 175.26 cm)    Michelle Butler CMA - 4/9/2020 10:32 AM CDT   Health Status   Tobacco Use? :   Former smoker   Michelle Butler CMA - 4/9/2020 10:32 AM CDT   Consents   Consent for Immunization Exchange :   Consent Granted   Consent for Immunizations to Providers :   Consent Granted   Michelle Butler CMA - 4/9/2020 10:32 AM CDT   Meds / Allergies   (As Of: 4/9/2020 10:34:13 AM CDT)   Allergies (Active)   No Known Medication Allergies  Estimated Onset Date:   Unspecified ; Created By:   Michelle Butler CMA; Reaction Status:   Active ; Category:   Drug ; Substance:   No Known Medication Allergies ; Type:   Allergy ; Updated By:   Michelle Butler CMA; Reviewed Date:   4/9/2020 10:32 AM CDT        Medication List   (As Of: 4/9/2020 10:34:13 AM CDT)   Prescription/Discharge Order    aspirin  :   aspirin ; Status:   Prescribed ; Ordered As Mnemonic:   aspirin 81 mg oral tablet ; Simple Display Line:   81 mg, 1 tab(s), po, daily, 30 tab(s), 11 Refill(s) ; Ordering Provider:   Osman Courtney MD; Catalog Code:   aspirin ; Order Dt/Tm:   1/8/2018 8:43:49 AM CST          atorvastatin  :   atorvastatin ; Status:   Prescribed ; Ordered As Mnemonic:   atorvastatin 40 mg oral tablet ; Simple Display Line:   20 mg, 0.5 tab(s), PO, Daily, 90 tab(s), 1 Refill(s) ; Ordering Provider:   Osman Courtney MD; Catalog Code:   atorvastatin ; Order Dt/Tm:   11/7/2019 10:21:20 AM CST          famotidine  :   famotidine ; Status:   Prescribed ; Ordered As Mnemonic:   famotidine 40 mg oral tablet ; Simple Display Line:   40 mg, 1 tab(s), Oral, hs, 90 tab(s), 0 Refill(s) ; Ordering Provider:   Osman Courtney MD; Catalog Code:   famotidine ; Order  Dt/Tm:   11/7/2019 10:21:51 AM CST          fluorouracil 5% topical cream  :   fluorouracil 5% topical cream ; Status:   Prescribed ; Ordered As Mnemonic:   fluorouracil 5% topical cream ; Simple Display Line:   See Instructions, as directed by physician, 40 gm ; Ordering Provider:   Osman Courtney MD; Catalog Code:   fluorouracil topical ; Order Dt/Tm:   6/12/2018 4:10:04 PM CDT          hydrochlorothiazide-triamterene  :   hydrochlorothiazide-triamterene ; Status:   Prescribed ; Ordered As Mnemonic:   hydrochlorothiazide-triamterene 25 mg-37.5 mg oral tablet ; Simple Display Line:   1 tab(s), Oral, daily, 90 tab(s), 1 Refill(s) ; Ordering Provider:   Osman Courtney MD; Catalog Code:   hydrochlorothiazide-triamterene ; Order Dt/Tm:   11/7/2019 10:21:12 AM CST

## 2022-02-15 NOTE — PROGRESS NOTES
Patient:   ABELARDO TINOCO            MRN: [id number]            FIN: [id number]               Age:   69 years     Sex:  Male     :  1950   Associated Diagnoses:   Chronic GERD; HLD (hyperlipidemia); Benign essential HTN; Sensation of chest tightness   Author:   Sherwin IGLESIAS, Osman      Impression and Plan   Diagnosis     Chronic GERD (OYJ91-VL K21.9).     Course:  Improving.    Orders     Orders   Charges (Evaluation and Management):  05158 office outpatient visit 25 minutes (Charge) (Order): Quantity: 1, Chronic GERD  HLD (hyperlipidemia)  Benign essential HTN  Sensation of chest tightness.     Orders (Selected)   Prescriptions  Prescribed  famotidine 40 mg oral tablet: = 1 tab(s) ( 40 mg ), Oral, hs, # 90 tab(s), 0 Refill(s), Type: Maintenance, OTC (Rx).     Diagnosis     HLD (hyperlipidemia) (OBC22-JD E78.5).     Course:  Progressing as expected.    Orders     Orders (Selected)   Prescriptions  Prescribed  atorvastatin 40 mg oral tablet: = 0.5 tab(s) ( 20 mg ), PO, Daily, # 90 tab(s), 1 Refill(s), Type: Maintenance, Pharmacy: [facility], 0.5 tab(s) Oral daily, 69, in, 20 10:02:00 CDT, Height Measured, 226, lb, 20 10:02:00 CDT, Weight Measured.     Diagnosis     Benign essential HTN (ALA54-KB I10).     Course:  Progressing as expected.    Orders     Orders (Selected)   Prescriptions  Prescribed  hydrochlorothiazide-triamterene 25 mg-37.5 mg oral tablet: 1 tab(s), Oral, daily, # 90 tab(s), 1 Refill(s), Type: Maintenance, Pharmacy: [facility], 1 tab(s) Oral daily, 69, in, 20 10:02:00 CDT, Height Measured, 226, lb, 20 10:02:00 CDT, Weight Measured.     Diagnosis     Sensation of chest tightness (KPR44-IW R07.89).     Course:  Worsening.    Orders     Orders (Selected)   Outpatient Orders  Ordered  Nuclear Stress Test, Lexiscan (Request): Sensation of chest tightness.        Visit Information      Date of Service: 2020 09:48 am  Performing Location: [facility]  Kaden Velasquez  Encounter#: [id number]      Primary Care Provider (PCP):  Osman Courtney MD, NPI# [id number]      Referring Provider:  Osman Courtney MD# [id number]   Visit type:  Scheduled follow-up.    Accompanied by:  Spouse.    Source of history:  Self, Spouse.    Referral source:  Self.    History limitation:  None.       Chief Complaint   7/7/2020 10:02 AM CDT    Pt here for med ck        History of Present Illness             The patient presents for follow-up evaluation of hypertension.  The quality of hypertension symptom(s) since the patient's last visit is described as being unchanged.  The severity of the hypertension symptom(s) since the last visit is moderate.  Since the patient's last visit, the timing/course of hypertension symptom(s) is constant.  Exacerbating factors consist of none.  Relieving factors consist of medication.  Associated symptoms consist of none.  Prior treatment consists of lifestyle modification (weight reduction, dietary sodium restriction, increased physical activity, adoption of DASH eating plan).  Medical encounters: none.  Compliance problems: none.        Interval History   Cholesterol Management   Total cholesterol results < 200 mg/dL (optimal).  LDL cholesterol results < 100 mg/dL (optimal).  Triglyceride results < 150 mg/dL (normal).  The course is progressing as expected.  The effect on daily activities is no change in activity level and no change in eating habits.  Associated symptoms characterized by no fatigue, chest pain, joint pain, muscle weakness or myalgias.     Reflux Esophagitis   The course is progressing as expected.  Compliance problems: none.  The effect on daily activities is no change in eating habits, no change in sleeping patterns and no change in work/school duties.  There were disease related encounters including none.  Associated symptoms characterized by no weight loss, chest pain, wheeze, epigastric pain, hematemesis, loss of appetite or nausea.         Review of Systems   Constitutional:  Negative.    Eye:  Negative.    Ear/Nose/Mouth/Throat:  Negative.    Respiratory:  Negative.    Cardiovascular:  Negative, chest tightness with walking especially in hot weather.    Gastrointestinal:  Negative.    Genitourinary:  Negative.    Hematology/Lymphatics:  Negative.    Endocrine:  Negative.    Immunologic:  Negative.    Musculoskeletal:  Joint pain.    Integumentary:  Negative.    Neurologic:  Negative.    Psychiatric:  Negative.    All other systems reviewed and negative      Health Status   Allergies:    Allergic Reactions (Selected)  No Known Medication Allergies   Medications:  (Selected)   Prescriptions  Prescribed  aspirin 81 mg oral tablet: 1 tab(s) ( 81 mg ), po, daily, # 30 tab(s), 11 Refill(s), Type: Maintenance, patient has supply at home (Rx)  atorvastatin 40 mg oral tablet: = 0.5 tab(s) ( 20 mg ), PO, Daily, # 90 tab(s), 1 Refill(s), Type: Maintenance, Pharmacy: [facility], 0.5 tab(s) Oral daily, 69, in, 07/07/20 10:02:00 CDT, Height Measured, 226, lb, 07/07/20 10:02:00 CDT, Weight Measured  famotidine 40 mg oral tablet: = 1 tab(s) ( 40 mg ), Oral, hs, # 90 tab(s), 0 Refill(s), Type: Maintenance, OTC (Rx)  fluorouracil 5% topical cream: See Instructions, Instructions: as directed by physician, # 40 gm, Type: Soft Stop, Pharmacy: [facility], as directed by physician  hydrochlorothiazide-triamterene 25 mg-37.5 mg oral tablet: 1 tab(s), Oral, daily, # 90 tab(s), 1 Refill(s), Type: Maintenance, Pharmacy: [facility], 1 tab(s) Oral daily, 69, in, 07/07/20 10:02:00 CDT, Height Measured, 226, lb, 07/07/20 10:02:00 CDT, Weight Measured   Problem list:    All Problems  Adenomatous polyp of colon / SNOMED CT 5602955527 / Confirmed  Benign essential HTN / SNOMED CT 2227752 / Confirmed  Chronic GERD / SNOMED CT 2437917506 / Confirmed  HLD (hyperlipidemia) / SNOMED CT 69213049 / Confirmed  Obesity / ICD-9-.00 / Probable  Other  allergic rhinitis / SNOMED CT 978015352 / Confirmed  Tear of meniscus of right knee / SNOMED CT 501251912 / Confirmed  Resolved: Familial Hyperlipidemia / ICD-9-.4      Histories   Past Medical History:    Resolved  Familial Hyperlipidemia (272.4):  Resolved.   Family History:    Cancer  Mother ()  Grandmother (M)     Procedure history:    Bucket handle tear of lateral meniscus of knee (SNOMED CT 564109023) performed by Malik Sumner DO on 2018 at 67 Years.  Comments:  2018 2:08 PM CDT - Elizabeth Armstrong.  Colonoscopy (SNOMED CT 590607901) performed by Ty Somers MD on 2018 at 67 Years.  Comments:  2018 3:34 PM CST - Ty Somers MD  Indication: Screening  Sedation: Fentanyl 100 mcg, Versed 2 mg  Findings: One tubular adenoma, diverticulosis  Rec: Repeat in 5 years  Tonsillectomy and adenoidectomy (SNOMED CT 895639787).   Social History:        Alcohol Assessment: Low Risk      Tobacco Assessment: Past                     Comments:                      2012 - Viv Rick CMA                     Quit x 20 yrs      Substance Abuse Assessment: Denies Substance Abuse      Employment and Education Assessment            Retired                     Comments:                      2018 - Osman Courtney MD                     Retired                       2012 - Darryl Stark MD      Home and Environment Assessment            Marital status: .      Exercise and Physical Activity Assessment: Does not exercise      Sexual Assessment            Sexually active: Yes.  Sexual orientation: Heterosexual.        Physical Examination   Vital Signs   2020 10:02 AM CDT Peripheral Pulse Rate 72 bpm    Systolic Blood Pressure 122 mmHg    Diastolic Blood Pressure 74 mmHg    Mean Arterial Pressure 90 mmHg    Oxygen Saturation 97 %      Measurements from flowsheet : Measurements   2020 10:02 AM CDT Height Measured - Standard 69 in     Weight Measured - Standard 226 lb    BSA 2.23 m2    Body Mass Index 33.37 kg/m2  HI      General:  No acute distress.    Neck:  Supple, No lymphadenopathy, No thyromegaly.    Respiratory:  Lungs are clear to auscultation, Respirations are non-labored, Breath sounds are equal, Symmetrical chest wall expansion.    Cardiovascular:  Normal rate, Regular rhythm, No murmur, No gallop, Good pulses equal in all extremities, Normal peripheral perfusion, No edema.    Gastrointestinal:  Soft, Non-tender, Non-distended, Normal bowel sounds, No organomegaly.    Integumentary:  Warm, Dry, Pink.    Neurologic:  Alert, Oriented.    Psychiatric:  Cooperative, Appropriate mood & affect.       Review / Management   Results review:  Lab results   7/1/2020 9:06 AM CDT Sodium Level 138 mmol/L    Potassium Level 4.1 mmol/L    Chloride Level 104 mmol/L    CO2 Level 24 mmol/L    Glucose Level 93 mg/dL    BUN 23 mg/dL    Creatinine Level 1.10 mg/dL    BUN/Creat Ratio NOT APPLICABLE    eGFR 69 mL/min/1.73m2    eGFR African American 80 mL/min/1.73m2    Calcium Level 9.6 mg/dL    Bilirubin Total 0.7 mg/dL    Alkaline Phosphatase 81 unit/L    AST/SGOT 30 unit/L    ALT/SGPT 26 unit/L    Protein Total 6.9 gm/dL    Albumin Level 4.4 gm/dL    Globulin 2.5    A/G Ratio 1.8    Cholesterol 203 mg/dL  HI    Non-HDL Cholesterol 152  HI    HDL 51 mg/dL    Cholesterol/HDL Ratio 4.0      HI    Triglyceride 145 mg/dL    PSA 1.3 ng/mL    WBC 7.5    RBC 5.13    Hgb 16.0 gm/dL    Hct 46.8 %    MCV 91.2 fL    MCH 31.2 pg    MCHC 34.2 gm/dL    RDW 12.8 %    Platelet 307    MPV 10.2 fL   .

## 2022-02-15 NOTE — LETTER
(Inserted Image. Unable to display)   [address] [facility] [address]  July 06, 2020      ABELARDO TINOCO   [place], [place] [id number]        Dear ABELARDO,     Thank you for selecting [facility] for your healthcare needs. Below you will find the results of the recent tests done at our clinic.        All results are within acceptable limits. No treatment changes are recommended at this time.      Result Name Current Result Previous Result Reference Range   Sodium Level (mmol/L)  138 7/1/2020  140 10/23/2019 135 - 146   Potassium Level (mmol/L)  4.1 7/1/2020  4.2 10/23/2019 3.5 - 5.3   Chloride Level (mmol/L)  104 7/1/2020  104 10/23/2019 98 - 110   CO2 Level (mmol/L)  24 7/1/2020  26 10/23/2019 20 - 32   Glucose Level (mg/dL)  93 7/1/2020  85 10/23/2019 65 - 99   BUN (mg/dL)  23 7/1/2020  20 10/23/2019 7 - 25   Creatinine Level (mg/dL)  1.10 7/1/2020  1.21 10/23/2019 0.70 - 1.25   BUN/Creat Ratio  NOT APPLICABLE 7/1/2020  NOT APPLICABLE 10/23/2019 6 - 22   eGFR (mL/min/1.73m2)  69 7/1/2020  62 10/23/2019 > OR = 60 -    eGFR  (mL/min/1.73m2)  80 7/1/2020  71 10/23/2019 > OR = 60 -    Calcium Level (mg/dL)  9.6 7/1/2020  9.4 10/23/2019 8.6 - 10.3   Bilirubin Total (mg/dL)  0.7 7/1/2020  0.5 10/23/2019 0.2 - 1.2   Alkaline Phosphatase (unit/L)  81 7/1/2020  77 10/23/2019 35 - 144   AST/SGOT (unit/L)  30 7/1/2020  20 10/23/2019 10 - 35   ALT/SGPT (unit/L)  26 7/1/2020  16 10/23/2019 9 - 46   Protein Total (gm/dL)  6.9 7/1/2020  6.7 10/23/2019 6.1 - 8.1   Albumin Level (gm/dL)  4.4 7/1/2020  4.1 10/23/2019 3.6 - 5.1   Globulin  2.5 7/1/2020  2.6 10/23/2019 1.9 - 3.7   A/G Ratio  1.8 7/1/2020  1.6 10/23/2019 1.0 - 2.5   Cholesterol (mg/dL) ((H)) 203 7/1/2020  176 10/23/2019  - <200   Non-HDL Cholesterol ((H)) 152 7/1/2020  126 10/23/2019  - <130   HDL (mg/dL)  51 7/1/2020  50 10/23/2019 > OR = 40 -    Cholesterol/HDL Ratio  4.0 7/1/2020  3.5 10/23/2019  - <5.0    LDL ((H)) 126 7/1/2020 ((H)) 105 10/23/2019    Triglyceride (mg/dL)  145 7/1/2020  113 10/23/2019  - <150   PSA (ng/mL)  1.3 7/1/2020  0.6 12/10/2018  - < OR = 4.0   WBC  7.5 7/1/2020  7.0 10/23/2019 3.8 - 10.8   RBC  5.13 7/1/2020  4.98 10/23/2019 4.20 - 5.80   Hgb (gm/dL)  16.0 7/1/2020  15.7 10/23/2019 13.2 - 17.1   Hct (%)  46.8 7/1/2020  44.5 10/23/2019 38.5 - 50.0   MCV (fL)  91.2 7/1/2020  89.4 10/23/2019 80.0 - 100.0   MCH (pg)  31.2 7/1/2020  31.5 10/23/2019 27.0 - 33.0   MCHC (gm/dL)  34.2 7/1/2020  35.3 10/23/2019 32.0 - 36.0   RDW (%)  12.8 7/1/2020  12.8 10/23/2019 11.0 - 15.0   Platelet  307 7/1/2020  279 10/23/2019 140 - 400   MPV (fL)  10.2 7/1/2020  10.1 10/23/2019 7.5 - 12.5       Please contact my practice at [phone]  if you have any questions or concerns.     Sincerely,        Osman Courtney MD          What do your labs mean?  Below is a glossary of commonly ordered labs:  LDL   Bad Cholesterol   HDL   Good Cholesterol  AST/ALT   Liver Function   Cr/Creatinine   Kidney Function  Microalbumin   Kidney Function  BUN   Kidney Function  PSA   Prostate    TSH   Thyroid Hormone  HgbA1c   Diabetes Test   Hgb (Hemoglobin)   Red Blood Cells

## 2022-02-15 NOTE — LETTER
(Inserted Image. Unable to display)     January 18, 2019      ABELARDO WRIGHTDONNA   [place], [place] [id number]          Dear ABELARDO,      Thank you for selecting [facility] (previously [facility]) for your healthcare needs.      Our records indicate you are due for the following services:     Hypertension check ~ please remember to bring your at-home blood pressure readings with you to your appointment.       To schedule an appointment or if you have further questions, please contact your primary clinic:   [facility]       [phone]   [facility]       [phone]              [facility]     [phone]      Powered by [employer]    Sincerely,    Osman Courtney MD

## 2022-02-15 NOTE — NURSING NOTE
Comprehensive Intake Entered On:  4/2/2019 8:32 AM CDT    Performed On:  4/2/2019 8:29 AM CDT by Clarita Espinoza CMA               Summary   Chief Complaint :   Pt here for med ck   Weight Measured :   228.4 lb(Converted to: 228 lb 6 oz, 103.60 kg)    Height Measured :   69 in(Converted to: 5 ft 9 in, 175.26 cm)    Body Mass Index :   33.73 kg/m2 (HI)    Body Surface Area :   2.24 m2   Systolic Blood Pressure :   130 mmHg   Diastolic Blood Pressure :   84 mmHg (HI)    Mean Arterial Pressure :   99 mmHg   Peripheral Pulse Rate :   72 bpm   Oxygen Saturation :   97 %   Clarita Espinoza CMA - 4/2/2019 8:29 AM CDT   Health Status   Allergies Verified? :   Yes   Medication History Verified? :   Yes   Medical History Verified? :   Yes   Pre-Visit Planning Status :   Completed   Tobacco Use? :   Former smoker   Clarita Espinoza CMA - 4/2/2019 8:29 AM CDT   Consents   Consent for Immunization Exchange :   Consent Granted   Consent for Immunizations to Providers :   Consent Granted   Clarita Espinoza CMA - 4/2/2019 8:29 AM CDT   Meds / Allergies   (As Of: 4/2/2019 8:32:55 AM CDT)   Allergies (Active)   No known allergies  Estimated Onset Date:   Unspecified ; Created By:   Viv Rick CMA; Reaction Status:   Active ; Category:   Drug ; Substance:   No known allergies ; Type:   Allergy ; Updated By:   Viv Rick CMA; Reviewed Date:   4/2/2019 8:31 AM CDT        Medication List   (As Of: 4/2/2019 8:32:55 AM CDT)   Prescription/Discharge Order    aspirin  :   aspirin ; Status:   Prescribed ; Ordered As Mnemonic:   aspirin 81 mg oral tablet ; Simple Display Line:   81 mg, 1 tab(s), po, daily, 30 tab(s), 11 Refill(s) ; Ordering Provider:   Osman Courtney MD; Catalog Code:   aspirin ; Order Dt/Tm:   1/8/2018 8:43:49 AM          atorvastatin  :   atorvastatin ; Status:   Prescribed ; Ordered As Mnemonic:   atorvastatin 40 mg oral tablet ; Simple Display Line:   20 mg, 0.5 tab(s), PO, Daily, 90 tab(s), 1 Refill(s) ;  Ordering Provider:   Osman Courtney MD; Catalog Code:   atorvastatin ; Order Dt/Tm:   5/29/2018 11:02:37 AM          azithromycin  :   azithromycin ; Status:   Processing ; Ordered As Mnemonic:   azithromycin 500 mg oral tablet ; Ordering Provider:   Osman Courtney MD; Action Display:   Complete ; Catalog Code:   azithromycin ; Order Dt/Tm:   4/2/2019 8:31:16 AM          fluorouracil 5% topical cream  :   fluorouracil 5% topical cream ; Status:   Prescribed ; Ordered As Mnemonic:   fluorouracil 5% topical cream ; Simple Display Line:   See Instructions, as directed by physician, 40 gm ; Ordering Provider:   Osman Courtney MD; Catalog Code:   fluorouracil topical ; Order Dt/Tm:   6/12/2018 4:10:04 PM          hydrochlorothiazide-triamterene  :   hydrochlorothiazide-triamterene ; Status:   Prescribed ; Ordered As Mnemonic:   hydrochlorothiazide-triamterene 25 mg-37.5 mg oral tablet ; Simple Display Line:   1 tab(s), Oral, daily, Pt due for appt, 30 tab(s), 0 Refill(s) ; Ordering Provider:   Osman Courtney MD; Catalog Code:   hydrochlorothiazide-triamterene ; Order Dt/Tm:   2/11/2019 9:53:07 AM          predniSONE  :   predniSONE ; Status:   Processing ; Ordered As Mnemonic:   predniSONE 20 mg oral tablet ; Ordering Provider:   Osman Courtney MD; Action Display:   Complete ; Catalog Code:   predniSONE ; Order Dt/Tm:   4/2/2019 8:31:20 AM          raNITIdine  :   raNITIdine ; Status:   Prescribed ; Ordered As Mnemonic:   raNITIdine 75 mg oral tablet ; Simple Display Line:   75 mg, 1 tab(s), PO, daily, for 30 day(s), 30 tab(s), 11 Refill(s) ; Ordering Provider:   Osman Courtney MD; Catalog Code:   raNITIdine ; Order Dt/Tm:   1/8/2018 8:42:28 AM

## 2022-02-15 NOTE — CARE COORDINATION
Pt appears on JULES chronic disease panel as out of parameters for HTN  Pt seen last for cough, not med check, placed new RTC for med check due now, pt should be out of medications.

## 2022-02-15 NOTE — TELEPHONE ENCOUNTER
---------------------  From: Michelle Butler CMA   To: Clarita Espinoza CMA;     Sent: 5/24/2021 10:55:10 AM CDT  Subject: General Message     Patient asks to speak with you directly. Please call.Tried to call... No answerPatient called again @ [phone] requesting Beatriz call # [phone] with pt and questions answered

## 2022-02-15 NOTE — NURSING NOTE
Depression Screening Entered On:  7/7/2020 11:46 AM CDT    Performed On:  7/7/2020 11:46 AM CDT by Clarita Espinoza CMA               Depression Screening   Little Interest - Pleasure in Activities :   Not at all   Feeling Down, Depressed, Hopeless :   Not at all   Initial Depression Screen Score :   0 Score   Poor Appetite or Overeating :   Not at all   Trouble Falling or Staying Asleep :   Not at all   Feeling Tired or Little Energy :   Not at all   Feeling Bad About Yourself :   Not at all   Trouble Concentrating :   Not at all   Moving or Speaking Slowly :   Not at all   Thoughts Better Off Dead or Hurting Self :   Not at all   CHELSIE Difficulty with Work, Home, Others :   Not difficult at all   Detailed Depression Screen Score :   0    Total Depression Screen Score :   0    Clarita Espinoza CMA - 7/7/2020 11:46 AM CDT

## 2022-02-15 NOTE — NURSING NOTE
CAGE Assessment Entered On:  7/7/2020 11:46 AM CDT    Performed On:  7/7/2020 11:46 AM CDT by Clarita Espinoza CMA               Assessment   Have you ever felt you should cut down on your drinking :   No   Have people annoyed you by criticizing your drinking :   No   Have you ever felt bad or guilty about your drinking :   No   Have you ever taken a drink first thing in the morning to steady your nerves or get rid of a hangover (Eye-opener) :   No   CAGE Score :   0    Clarita Espinoza CMA - 7/7/2020 11:46 AM CDT

## 2022-02-15 NOTE — LETTER
(Inserted Image. Unable to display)   August 06, 2021      [place] [address]        Thank you for choosing [facility] for your healthcare needs.          Result Name Current Result Previous Result Reference Range   Administrative Form   8/2/2021     Lab Report   8/2/2021   2/10/2021    Platelet  273 2/10/2021  140 - 400   RLI Bill Type Override Form   8/2/2021     Glucose Level (mg/dL)  88 8/2/2021  86 2/10/2021 65 - 99   BUN (mg/dL)  21 8/2/2021  24 2/10/2021 7 - 25   Creatinine Level (mg/dL)  1.06 8/2/2021  1.24 2/10/2021 0.70 - 1.25   eGFR (mL/min/1.73m2)  71 8/2/2021 ((L)) 59 2/10/2021 > OR = 60 -    eGFR  (mL/min/1.73m2)  83 8/2/2021  69 2/10/2021 > OR = 60 -    BUN/Creat Ratio  NOT APPLICABLE 8/2/2021  NOT APPLICABLE 2/10/2021 6 - 22   Sodium Level (mmol/L)  140 8/2/2021  138 2/10/2021 135 - 146   Potassium Level (mmol/L)  4.4 8/2/2021  4.1 2/10/2021 3.5 - 5.3   Chloride Level (mmol/L)  104 8/2/2021  101 2/10/2021 98 - 110   CO2 Level (mmol/L)  28 8/2/2021  30 2/10/2021 20 - 32   Calcium Level (mg/dL)  9.3 8/2/2021  9.6 2/10/2021 8.6 - 10.3   Cholesterol (mg/dL)  186 8/2/2021 ((H)) 208 2/10/2021  - <200   HDL (mg/dL)  48 8/2/2021  49 2/10/2021 > OR = 40 -    Triglyceride (mg/dL)  135 8/2/2021  106 2/10/2021  - <150   LDL ((H)) 113 8/2/2021 ((H)) 138 2/10/2021    Cholesterol/HDL Ratio  3.9 8/2/2021  4.2 2/10/2021  - <5.0   Non-HDL Cholesterol ((H)) 138 8/2/2021 ((H)) 159 2/10/2021  - <130         Sincerely,        Harsh Rivera MD

## 2022-02-15 NOTE — PROGRESS NOTES
Patient:   THIAGO TINOCO            MRN: [id number]            FIN: [id number]               Age:   71 years     Sex:  Male     :  1950   Associated Diagnoses:   Benign essential HTN; Chronic GERD; HLD (hyperlipidemia)   Author:   Harsh Rivera MD      Visit Information      Date of Service: 2021 12:50 pm  Performing Location: [facility]  Encounter#: [id number]      Primary Care Provider (PCP):  Harsh Rivera MD    NPI# [id number]      Referring Provider:  Harsh Rivera MD    NPI# [id number]      Chief Complaint   2021 1:15 PM CDT     f/u chronic - establish care              Additional Information:No additional information recorded during visit.   Chief complaint and symptoms as noted above and confirmed with patient.  Recent lab and diagnostic studies reviewed with patient      History of Present Illness   2021: Thiago presents to clinic to establish care, accompanied by his wife Corina.  Previously followed with Dr. Courtney.  Generally describe himself as healthy.  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  Has a history of hypertension hyperlipidemia.  Currently on atorvastatin 20 mg daily; he seems to remember having more myalgia complaints on higher dose of 40 mg though not clearly was implicated to the medication or not.  Some skin question concerns related to fair skin.  He has no history of skin cancer himself.         Review of Systems   Constitutional:  No fever, No chills.    Eye:  Negative except as documented in history of present illness.    Ear/Nose/Mouth/Throat:  Negative except as documented in history of present illness.    Respiratory:  No shortness of breath.    Cardiovascular:  No chest pain, No palpitations, No peripheral edema, No syncope.    Gastrointestinal:  No nausea, No vomiting, No abdominal pain.    Genitourinary:  No dysuria, No hematuria.    Hematology/Lymphatics:  Negative except as documented in history of present illness.     Endocrine:  No excessive thirst, No polyuria.    Immunologic:  No recurrent fevers.    Musculoskeletal:  No joint pain, No muscle pain.    Integumentary:  Skin lesion.    Neurologic:  Alert and oriented X4, No numbness, No tingling, No headache.       Health Status   Allergies:    Allergic Reactions (Selected)  No Known Medication Allergies   Medications:  (Selected)   Prescriptions  Prescribed  aspirin 81 mg oral tablet: 1 tab(s) ( 81 mg ), po, daily, # 30 tab(s), 11 Refill(s), Type: Maintenance, patient has supply at home (Rx)  atorvastatin 20 mg oral tablet: = 2 tab(s) ( 40 mg ), Oral, daily, # 180 tab(s), 3 Refill(s), Type: Maintenance, Pharmacy: [facility], 2 tab(s) Oral daily, 69, in, 08/06/21 13:15:00 CDT, Height Measured, 232.2, lb, 08/06/21 13:15:00 CDT, Weight Measured  famotidine 40 mg oral tablet: = 1 tab(s) ( 40 mg ), Oral, hs, # 90 tab(s), 1 Refill(s), Type: Maintenance, Pharmacy: [facility], 1 tab(s) Oral hs, 69, in, 02/10/21 7:13:00 CST, Height Measured, 235, lb, 02/10/21 7:24:00 CST, Weight Measured  fluorouracil 5% topical cream: See Instructions, Instructions: as directed by physician, # 40 gm, 0 Refill(s), Type: Soft Stop, Pharmacy: [facility], as directed by physician, 69, in, 08/06/21 13:15:00 CDT, Height Measured, 232.2, lb, 08/06/21 13:15:00 CDT, Weight Measured  hydrochlorothiazide-triamterene 25 mg-37.5 mg oral tablet: 1 tab(s), Oral, daily, # 90 tab(s), 1 Refill(s), Type: Maintenance, Pharmacy: [facility], 1 tab(s) Oral daily, 69, in, 02/10/21 7:13:00 CST, Height Measured, 235, lb, 02/10/21 7:24:00 CST, Weight Measured,    Medications          *denotes recorded medication          aspirin 81 mg oral tablet: 81 mg, 1 tab(s), po, daily, 30 tab(s), 11 Refill(s).          atorvastatin 20 mg oral tablet: 40 mg, 2 tab(s), Oral, daily, 180 tab(s), 3 Refill(s).          famotidine 40 mg oral tablet: 40 mg, 1 tab(s), Oral, hs, 90 tab(s), 1 Refill(s).           fluorouracil 5% topical cream: See Instructions, as directed by physician, 40 gm, 0 Refill(s).          hydrochlorothiazide-triamterene 25 mg-37.5 mg oral tablet: 1 tab(s), Oral, daily, 90 tab(s), 1 Refill(s).       Problem list:    All Problems  Adenomatous polyp of colon / SNOMED CT 9585983614 / Confirmed  Other allergic rhinitis / SNOMED CT 983725866 / Confirmed  Benign essential HTN / SNOMED CT 5813343 / Confirmed  Chronic GERD / SNOMED CT 0245509437 / Confirmed  HLD (hyperlipidemia) / SNOMED CT 04689152 / Confirmed  Obesity / ICD-9-.00 / Probable  Tear of meniscus of right knee / SNOMED CT 980694062 / Confirmed  Resolved: Familial Hyperlipidemia / ICD-9-.4      Histories   Past Medical History:    Resolved  Familial Hyperlipidemia (272.4):  Resolved.   Family History:    Cancer  Mother ()  Grandmother (M)     Procedure history:    Bucket handle tear of lateral meniscus of knee (979876509) on 2018 at 67 Years.  Comments:  2018 2:08 PM CDT - Elizabeth Armstrong.  Colonoscopy (455011694) on 2018 at 67 Years.  Comments:  2018 3:34 PM ASMITA - Ty Somers MD  Indication: Screening  Sedation: Fentanyl 100 mcg, Versed 2 mg  Findings: One tubular adenoma, diverticulosis  Rec: Repeat in 5 years  Tonsillectomy and adenoidectomy (109112714).   Social History:        Electronic Cigarette/Vaping Assessment            Electronic Cigarette Use: Never.      Alcohol Assessment: Low Risk      Tobacco Assessment: Past            Former smoker, quit more than 30 days ago      Substance Abuse Assessment: Denies Substance Abuse      Employment and Education Assessment            Retired                     Comments:                      2018 - Sherwin IGLESIAS, [place]man                     Retired                       2012 - Mi IGLESIAS, Darryl bower      Home and Environment Assessment            Marital status: .      Exercise and Physical Activity Assessment:  Does not exercise      Sexual Assessment            Sexually active: Yes.  Sexual orientation: Heterosexual.        Physical Examination   vital signs stable, as noted above   Vital Signs   8/6/2021 1:15 PM CDT Temperature Tympanic 97.2 DegF  LOW    Peripheral Pulse Rate 68 bpm    Systolic Blood Pressure 119 mmHg    Diastolic Blood Pressure 83 mmHg  HI    Mean Arterial Pressure 95 mmHg    Oxygen Saturation 97 %      Measurements from flowsheet : Measurements   8/6/2021 1:15 PM CDT Height Measured - Standard 69 in    Weight Measured - Standard 232.2 lb    BSA 2.26 m2    Body Mass Index 34.29 kg/m2  HI      General:  Alert and oriented, No acute distress.    Eye:  Extraocular movements are intact.    HENT:  Normocephalic, Oral mucosa is moist.    Neck:  Supple, No carotid bruit, No jugular venous distention, No lymphadenopathy.    Respiratory:  Lungs are clear to auscultation, Respirations are non-labored.    Cardiovascular:  Normal rate, Regular rhythm, No murmur, No edema.    Gastrointestinal:  Soft, Non-distended, Normal bowel sounds, No organomegaly.    Musculoskeletal:  Normal range of motion, Normal strength, No tenderness.    Integumentary:  actinic keratoses seen on left cheek.    Neurologic:  Alert, Oriented, Normal motor function, No focal deficits.    Cognition and Speech:  Oriented, Speech clear and coherent.    Psychiatric:  Appropriate mood & affect.       Review / Management   Results review:  Lab results   8/2/2021 8:56 AM CDT Sodium Level 140 mmol/L    Potassium Level 4.4 mmol/L    Chloride Level 104 mmol/L    CO2 Level 28 mmol/L    Glucose Level 88 mg/dL    BUN 21 mg/dL    Creatinine 1.06 mg/dL    BUN/Creat Ratio NOT APPLICABLE    eGFR 71 mL/min/1.73m2    eGFR African American 83 mL/min/1.73m2    Calcium Level 9.3 mg/dL    Cholesterol 186 mg/dL    Non-  HI    HDL 48 mg/dL    Chol/HDL Ratio 3.9      HI    Triglyceride 135 mg/dL   .       Impression and Plan   Diagnosis     Benign  essential HTN (PGY29-QZ I10).     Chronic GERD (XNZ52-PG K21.9).     HLD (hyperlipidemia) (SFI87-UO E78.5).       .) Hypertension, controlled  current antihypertensive regimen: hctz/triamterene 25/37.5mg daily  regimen changes: none  intolerance:  future titration/work-up plan:     .) hyperlipidemia  - on atorvastatin 20mg qhs  - LDL historically >100   - I'd like him to try and increase atorvastatin to 40mg qhs    .) GERD, controlled  - on famotidine 40mg daily    .) health maintenance  - last colonoscopy in '18 (tubular adenoma) - due in '23  - historically has not pursued PSA testing  - adult vaccinations up to date; including COVID vaccine  - h/o actinic keratoses on scalp/face   - cryotherapy to facial/cheek lesion   - renewed Efudex cream for scalp   - consideration for future dermatology referral    RTC in 6 months; SHANIQUA

## 2022-02-15 NOTE — PROGRESS NOTES
Patient:   ABELARDO TINOCO            MRN: [id number]            FIN: [id number]               Age:   69 years     Sex:  Male     :  1950   Associated Diagnoses:   Acute myringitis of left ear   Author:   Osman Courtney MD      Impression and Plan   Diagnosis     Acute myringitis of left ear (ETN96-TV H73.002).     Course:  Worsening.    Orders     Orders   Charges (Evaluation and Management):  [id number] physician telephone evaluation 11-20 min (Charge) (Order): Quantity: 1, Acute myringitis of left ear.     Orders (Selected)   Prescriptions  Prescribed  cephalexin 500 mg oral capsule: = 1 cap(s) ( 500 mg ), PO, tid, # 30 cap(s), 0 Refill(s), Type: Maintenance, Pharmacy: [facility], 1 cap(s) Oral tid,x10 day(s).        Visit Information      Date of Service: 2020 10:27 am  Performing Location: [facility]  Encounter#: [id number]      Primary Care Provider (PCP):  Osman Courtney MD    NPI# [id number]   Visit type:  Telephone Encounter.    Source of history:  Self.    Location of patient:  _home  Call Start Time:   _1100  Call End Time:    _1120   Referral source:  Self.    History limitation:  None.       Chief Complaint   Patient needs routine refill of regular medications   2020 10:32 AM CDT    c/o Cough, Left ear ache, ears popping, sore throat since yesterday; verbal consent for telephone visit     _      History of Present Illness   Today's visit was conducted via telephone due to the COVID-19 pandemic.     Reason for visit:  _Patient has viral upper respiratory infection with congestion, earache, scratchy throat and cough.  Patient denies fever or dyspnea. Left ear pain is the most prominent symptom. Recommend antibiotic for presumed otitis media.  Patient will self quarentine for 14 days.  Follow up in ER if pneumonia symptoms develop.      Review of Systems   Constitutional:  Negative.    Eye:  Negative.    Ear/Nose/Mouth/Throat:  Negative except as documented in history of  present illness.    Respiratory:  Negative except as documented in history of present illness.    Cardiovascular:  Negative.    Gastrointestinal:  Negative.    Genitourinary:  Negative.    Hematology/Lymphatics:  Negative.    Endocrine:  Negative.    Immunologic:  Negative.    Musculoskeletal:  Negative.    Integumentary:  Negative.    Neurologic:  Negative.    Psychiatric:  Negative.    All other systems reviewed and negative      Health Status   Allergies:    Allergic Reactions (Selected)  No Known Medication Allergies   Medications:  (Selected)   Prescriptions  Prescribed  aspirin 81 mg oral tablet: 1 tab(s) ( 81 mg ), po, daily, # 30 tab(s), 11 Refill(s), Type: Maintenance, patient has supply at home (Rx)  atorvastatin 40 mg oral tablet: = 0.5 tab(s) ( 20 mg ), PO, Daily, # 90 tab(s), 1 Refill(s), Type: Maintenance, Pharmacy: [facility], 0.5 tab(s) Oral daily  cephalexin 500 mg oral capsule: = 1 cap(s) ( 500 mg ), PO, tid, # 30 cap(s), 0 Refill(s), Type: Maintenance, Pharmacy: [facility], 1 cap(s) Oral tid,x10 day(s)  famotidine 40 mg oral tablet: = 1 tab(s) ( 40 mg ), Oral, hs, # 90 tab(s), 0 Refill(s), Type: Maintenance, OTC (Rx)  fluorouracil 5% topical cream: See Instructions, Instructions: as directed by physician, # 40 gm, Type: Soft Stop, Pharmacy: [facility], as directed by physician  hydrochlorothiazide-triamterene 25 mg-37.5 mg oral tablet: 1 tab(s), Oral, daily, # 90 tab(s), 1 Refill(s), Type: Maintenance, Pharmacy: [facility], 1 tab(s) Oral daily,    Medications          *denotes recorded medication          aspirin 81 mg oral tablet: 81 mg, 1 tab(s), po, daily, 30 tab(s), 11 Refill(s).          atorvastatin 40 mg oral tablet: 20 mg, 0.5 tab(s), PO, Daily, 90 tab(s), 1 Refill(s).          cephalexin 500 mg oral capsule: 500 mg, 1 cap(s), PO, tid, for 10 day(s), 30 cap(s), 0 Refill(s).          famotidine 40 mg oral tablet: 40 mg, 1 tab(s), Oral, hs, 90 tab(s), 0  Refill(s).          fluorouracil 5% topical cream: See Instructions, as directed by physician, 40 gm.          hydrochlorothiazide-triamterene 25 mg-37.5 mg oral tablet: 1 tab(s), Oral, daily, 90 tab(s), 1 Refill(s).       Problem list:    All Problems  Adenomatous polyp of colon / SNOMED CT 7305020943 / Confirmed  Benign essential HTN / SNOMED CT 9706623 / Confirmed  Chronic GERD / SNOMED CT 3428464373 / Confirmed  HLD (hyperlipidemia) / SNOMED CT 29776724 / Confirmed  Obesity / ICD-9-.00 / Probable  Other allergic rhinitis / SNOMED CT 502316403 / Confirmed  Tear of meniscus of right knee / SNOMED CT 039430369 / Confirmed  Resolved: Familial Hyperlipidemia / ICD-9-.4      Histories   Past Medical History:    Resolved  Familial Hyperlipidemia (272.4):  Resolved.   Family History:    Cancer  Mother ()  Grandmother (M)     Procedure history:    Bucket handle tear of lateral meniscus of knee (SNOMED CT 427304467) performed by Malik Sumner DO on 2018 at 67 Years.  Comments:  2018 2:08 PM CDT - Elizabeth Armstrong.  Colonoscopy (SNOMED CT 212615426) performed by Ty Somers MD on 2018 at 67 Years.  Comments:  2018 3:34 PM CST - Ty Somers MD  Indication: Screening  Sedation: Fentanyl 100 mcg, Versed 2 mg  Findings: One tubular adenoma, diverticulosis  Rec: Repeat in 5 years  Tonsillectomy and adenoidectomy (SNOMED CT 226396536).   Social History:        Alcohol Assessment: Low Risk      Tobacco Assessment: Past                     Comments:                      2012 - Viv Rick CMA                     Quit x 20 yrs      Substance Abuse Assessment: Denies Substance Abuse      Employment and Education Assessment            Retired                     Comments:                      2018 - Osman Courtney MD                     Retired                       2012 - Darryl Stark MD      Home and Environment Assessment             Marital status: .      Exercise and Physical Activity Assessment: Does not exercise      Sexual Assessment            Sexually active: Yes.  Sexual orientation: Heterosexual.        Physical Examination   General:  Alert and oriented, No acute distress.    Respiratory:  Respirations are non-labored.    Psychiatric:  Cooperative, Appropriate mood & affect, Normal judgment.

## 2022-02-15 NOTE — TELEPHONE ENCOUNTER
---------------------  From: Rosina Ramsay RN (Phone Messages Pool ([id number]_[facility]))   Sent: 10/4/2021 10:49:35 AM CDT  Subject: General Message       PCP:  AMANUEL     Time of Call:        Person Calling:  [facility]  Phone number:  [phone]    Returned call at: 10:45am    Note:   Vm received from Candelario, requesting to clarify atorvastatin dosage.    Chart reviewed; dose confirmed atorvastatin 20mg tabs 2 tabs QD

## 2022-02-15 NOTE — NURSING NOTE
Comprehensive Intake Entered On:  11/7/2019 9:52 AM CST    Performed On:  11/7/2019 9:48 AM CST by Clarita Espinoza CMA               Summary   Chief Complaint :   Pt here for med ck   Weight Measured :   226 lb(Converted to: 226 lb 0 oz, 102.51 kg)    Height Measured :   69 in(Converted to: 5 ft 9 in, 175.26 cm)    Body Mass Index :   33.37 kg/m2 (HI)    Body Surface Area :   2.23 m2   Systolic Blood Pressure :   126 mmHg   Diastolic Blood Pressure :   80 mmHg   Mean Arterial Pressure :   95 mmHg   Peripheral Pulse Rate :   71 bpm   Oxygen Saturation :   97 %   Clarita Espinoza CMA - 11/7/2019 9:48 AM CST   Health Status   Allergies Verified? :   Yes   Medication History Verified? :   Yes   Medical History Verified? :   Yes   Pre-Visit Planning Status :   Completed   Tobacco Use? :   Former smoker   Clarita Espinoza CMA - 11/7/2019 9:48 AM CST   Consents   Consent for Immunization Exchange :   Consent Granted   Consent for Immunizations to Providers :   Consent Granted   Clarita Espinoza CMA - 11/7/2019 9:48 AM CST   Meds / Allergies   (As Of: 11/7/2019 9:52:47 AM CST)   Allergies (Active)   No known allergies  Estimated Onset Date:   Unspecified ; Created By:   Viv Rick CMA; Reaction Status:   Active ; Category:   Drug ; Substance:   No known allergies ; Type:   Allergy ; Updated By:   Viv Rick CMA; Reviewed Date:   11/7/2019 9:49 AM CST        Medication List   (As Of: 11/7/2019 9:52:47 AM CST)   Prescription/Discharge Order    aspirin  :   aspirin ; Status:   Prescribed ; Ordered As Mnemonic:   aspirin 81 mg oral tablet ; Simple Display Line:   81 mg, 1 tab(s), po, daily, 30 tab(s), 11 Refill(s) ; Ordering Provider:   Osman Courtney MD; Catalog Code:   aspirin ; Order Dt/Tm:   1/8/2018 8:43:49 AM CST          atorvastatin  :   atorvastatin ; Status:   Prescribed ; Ordered As Mnemonic:   atorvastatin 40 mg oral tablet ; Simple Display Line:   20 mg, 0.5 tab(s), PO, Daily, 90 tab(s), 1 Refill(s) ;  Ordering Provider:   Osman Courtney MD; Catalog Code:   atorvastatin ; Order Dt/Tm:   4/2/2019 8:58:30 AM CDT          cephalexin  :   cephalexin ; Status:   Processing ; Ordered As Mnemonic:   cephalexin 500 mg oral capsule ; Ordering Provider:   Osman Courtney MD; Action Display:   Complete ; Catalog Code:   cephalexin ; Order Dt/Tm:   11/7/2019 9:49:02 AM CST          fluorouracil 5% topical cream  :   fluorouracil 5% topical cream ; Status:   Prescribed ; Ordered As Mnemonic:   fluorouracil 5% topical cream ; Simple Display Line:   See Instructions, as directed by physician, 40 gm ; Ordering Provider:   Osman Courtney MD; Catalog Code:   fluorouracil topical ; Order Dt/Tm:   6/12/2018 4:10:04 PM CDT          hydrochlorothiazide-triamterene  :   hydrochlorothiazide-triamterene ; Status:   Prescribed ; Ordered As Mnemonic:   hydrochlorothiazide-triamterene 25 mg-37.5 mg oral tablet ; Simple Display Line:   1 tab(s), Oral, daily, 90 tab(s), 1 Refill(s) ; Ordering Provider:   Osman Courtney MD; Catalog Code:   hydrochlorothiazide-triamterene ; Order Dt/Tm:   4/8/2019 12:20:00 PM CDT          predniSONE  :   predniSONE ; Status:   Processing ; Ordered As Mnemonic:   predniSONE 20 mg oral tablet ; Ordering Provider:   Osman Courtney MD; Action Display:   Complete ; Catalog Code:   predniSONE ; Order Dt/Tm:   11/7/2019 9:49:02 AM CST          raNITIdine  :   raNITIdine ; Status:   Prescribed ; Ordered As Mnemonic:   raNITIdine 75 mg oral tablet ; Simple Display Line:   75 mg, 1 tab(s), PO, daily, 90 tab(s), 1 Refill(s) ; Ordering Provider:   Osman Courtney MD; Catalog Code:   raNITIdine ; Order Dt/Tm:   4/2/2019 9:04:13 AM CDT

## 2022-02-15 NOTE — PROGRESS NOTES
Patient:   ABELARDO TINOCO            MRN: [id number]            FIN: [id number]               Age:   70 years     Sex:  Male     :  1950   Associated Diagnoses:   None   Author:   Osman Courtney MD      Report Summary   Diagnosis  Medicare Annual Wellness Visit.  Course:  Progressing as expected. OrdersPlanPatient Instructions:       Counseled: Patient, Regarding medications, Diet, Activity, Prognosis/ end-of-life issues, Verbalized understanding. SummaryOrders  Orders   Charges (Evaluation and Management):  31201 periodic preventive med est patient 65yrs+> (Charge) (Order): Quantity: 1, Well adult health check.  Orders (Selected)   Outpatient Orders  Ordered (In Transit)  CBC (h/h, RBC, indices, WBC, Plt)* ([facility]): Specimen Type: Blood, Collection Date: 02/10/21 8:02:00 CST  Comprehensive Metabolic Panel* ([facility]): Specimen Type: Serum, Collection Date: 02/10/21 8:02:00 CST  Lipid panel with reflex to direct ldl* ([facility]): Specimen Type: Serum, Collection Date: 02/10/21 8:02:00 CST.  Counseled:  Patient.    Impression and Plan   Diagnosis     Medicare Annual Wellness Visit.     Course:  Progressing as expected.    Orders   Plan:  Eye exam yearly: UTD  Dental exam yearly: UTD  Depression Screen yearly: UTD  PSA yearly: UTD  Colonoscopy: UTD  Influenza vaccine yearly: UTD  Prevnar 13 once age 65: UTD  Pneumovax 23 once age 65: UTD  Zostavax once age 60: recommended  Adacel every ten years: recommended  Lipid screen every five years: ordered  Fasting Glucose every five years: ordered.    Patient Instructions:       Counseled: Patient, Regarding medications, Diet, Activity, Prognosis/ end-of-life issues, Verbalized understanding.    Summary:  The following elements of the Wellness Visit were reviewed and positive findings were addressed:  Health Risk Assessment  Patient Health History  Preventative Services Checklist  Family and Social History  Medical and Surgical History  Medication List and Medication Allergies    Immunizations and Vaccines  Current Provider List  Depression, Anxiety and Substance abuse screening  Advanced Care Planning  Risk Factorys and Recommended Interventions  Complete Physical Exam  Cognitive Function Screening  Assessment of Activities of Daily Living.    Orders     Orders   Charges (Evaluation and Management):  97907 periodic preventive med est patient 65yrs+> (Charge) (Order): Quantity: 1, Well adult health check.     Orders (Selected)   Outpatient Orders  Ordered (In Transit)  CBC (h/h, RBC, indices, WBC, Plt)* ([facility]): Specimen Type: Blood, Collection Date: 02/10/21 8:02:00 CST  Comprehensive Metabolic Panel* ([facility]): Specimen Type: Serum, Collection Date: 02/10/21 8:02:00 CST  Lipid panel with reflex to direct ldl* ([facility]): Specimen Type: Serum, Collection Date: 02/10/21 8:02:00 CST.     Counseled:  Patient, appropriate weight and diet, Discussed preventive services including, Lipid Screening, DM Screening, Nutrition, Bone Mass, Cancer Screening , immunization ( flu, pneumovax, hep B, Zostavax).  Glaucoma screening, auditory acuity, cardiovascular disease screening..       Visit Information      Date of Service: 02/10/2021 07:08 am  Performing Location: [facility]  Encounter#: [id number]      Primary Care Provider (PCP):  Osman Courtney MD# [id number]      Referring Provider:  Osman Courtney MD# [id number]   Visit type:  General concerns, Annual Wellness Visit.    Source of history:  Self.    History limitation:  None.       Chief Complaint   2/10/2021 7:13 AM CST    Pt here for AWV..visit details reviewed     Annual Wellness Medicare Physical      History of Present Illness             The patient presents for a general exam.  Complaint:.  Review of functional ability.  1. No hearing impairment (Hearing aids, symptoms, history)      2. Normal activities of daily living (Hygiene, eating, cooking, cleaning, shopping, errands )  3. No falls risk (Walking, transferring,  cane, walker, aids)   4. No home safety issues (Surfaces, steps)   .        Review of Systems   Constitutional:  Negative.    Eye:  Negative.    Ear/Nose/Mouth/Throat:  Negative.    Respiratory:  Negative.    Cardiovascular:  Negative.    Breast:  Negative.    Gastrointestinal:  Negative.    Genitourinary:  Negative.    Gynecologic:  Negative.    Hematology/Lymphatics:  Negative.    Endocrine:  Negative.    Immunologic:  Negative.    Musculoskeletal:  Negative.    Integumentary:  Negative.    Neurologic:  Negative.    Psychiatric:  Negative.    All other systems reviewed and negative      Health Status   Allergies:    Allergic Reactions (Selected)  No Known Medication Allergies   Medications:  (Selected)   Prescriptions  Prescribed  aspirin 81 mg oral tablet: 1 tab(s) ( 81 mg ), po, daily, # 30 tab(s), 11 Refill(s), Type: Maintenance, patient has supply at home (Rx)  atorvastatin 40 mg oral tablet: = 0.5 tab(s) ( 20 mg ), PO, Daily, # 90 tab(s), 1 Refill(s), Type: Maintenance, Pharmacy: [facility], 0.5 tab(s) Oral daily, 69, in, 02/10/21 7:13:00 CST, Height Measured, 235, lb, 02/10/21 7:24:00 CST, Weight Measured  famotidine 40 mg oral tablet: = 1 tab(s) ( 40 mg ), Oral, hs, # 90 tab(s), 1 Refill(s), Type: Maintenance, Pharmacy: [facility], 1 tab(s) Oral hs, 69, in, 02/10/21 7:13:00 CST, Height Measured, 235, lb, 02/10/21 7:24:00 CST, Weight Measured  fluorouracil 5% topical cream: See Instructions, Instructions: as directed by physician, # 40 gm, Type: Soft Stop, Pharmacy: [facility], as directed by physician  hydrochlorothiazide-triamterene 25 mg-37.5 mg oral tablet: 1 tab(s), Oral, daily, # 90 tab(s), 1 Refill(s), Type: Maintenance, Pharmacy: [facility], 1 tab(s) Oral daily, 69, in, 02/10/21 7:13:00 CST, Height Measured, 235, lb, 02/10/21 7:24:00 CST, Weight Measured,    Medications          *denotes recorded medication          aspirin 81 mg oral tablet: 81 mg, 1 tab(s), po,  daily, 30 tab(s), 11 Refill(s).          atorvastatin 40 mg oral tablet: 20 mg, 0.5 tab(s), PO, Daily, 90 tab(s), 1 Refill(s).          famotidine 40 mg oral tablet: 40 mg, 1 tab(s), Oral, hs, 90 tab(s), 1 Refill(s).          fluorouracil 5% topical cream: See Instructions, as directed by physician, 40 gm.          hydrochlorothiazide-triamterene 25 mg-37.5 mg oral tablet: 1 tab(s), Oral, daily, 90 tab(s), 1 Refill(s).       Problem list:    All Problems  Adenomatous polyp of colon / SNOMED CT 8347270075 / Confirmed  Benign essential HTN / SNOMED CT 4502710 / Confirmed  Chronic GERD / SNOMED CT 0663353221 / Confirmed  HLD (hyperlipidemia) / SNOMED CT 33047171 / Confirmed  Obesity / ICD-9-.00 / Probable  Other allergic rhinitis / SNOMED CT 139726240 / Confirmed  Tear of meniscus of right knee / SNOMED CT 517607304 / Confirmed  Resolved: Familial Hyperlipidemia / ICD-9-.4   CURRENT PROVIDERS AND SUPPLIERS  NAME  SPECIALTY  REASON      Histories   Past Medical History:    Resolved  Familial Hyperlipidemia (272.4):  Resolved.   Family History:    Sister    History is negative.  Sister    History is negative.  Brother    History is negative.  Mother:  ()  Age at death unknown.   Cancer  Grandmother (M)  Cancer     Procedure history:    Bucket handle tear of lateral meniscus of knee (SNOMED CT 773177782) performed by Malik Sumner DO on 2018 at 67 Years.  Comments:  2018 2:08 PM CDT - Elizabeth Armstrong.  Colonoscopy (SNOMED CT 630813262) performed by Ty Somers MD on 2018 at 67 Years.  Comments:  2018 3:34 PM CST - Ty Somers MD  Indication: Screening  Sedation: Fentanyl 100 mcg, Versed 2 mg  Findings: One tubular adenoma, diverticulosis  Rec: Repeat in 5 years  Tonsillectomy and adenoidectomy (SNOMED CT 178803681).   Social History:        Electronic Cigarette/Vaping Assessment            Electronic Cigarette Use: Never.      Alcohol Assessment: Low Risk      Tobacco  Assessment: Past            Former smoker, quit more than 30 days ago      Substance Abuse Assessment: Denies Substance Abuse      Employment and Education Assessment            Retired                     Comments:                      07/03/2018 - Osman Courtney MD                     Retired                       03/28/2012 - Darryl Stark MD      Home and Environment Assessment            Marital status: .      Exercise and Physical Activity Assessment: Does not exercise      Sexual Assessment            Sexually active: Yes.  Sexual orientation: Heterosexual.  ,        Electronic Cigarette/Vaping Assessment            Electronic Cigarette Use: Never.      Alcohol Assessment: Low Risk      Tobacco Assessment: Past            Former smoker, quit more than 30 days ago      Substance Abuse Assessment: Denies Substance Abuse      Employment and Education Assessment            Retired                     Comments:                      07/03/2018 - Osman Courtney MD                     Retired                       03/28/2012 - Darryl Stark MD      Home and Environment Assessment            Marital status: .      Exercise and Physical Activity Assessment: Does not exercise      Sexual Assessment            Sexually active: Yes.  Sexual orientation: Heterosexual.        Physical Examination   Vital signs reviewed  and within acceptable limits         Vital Signs   2/10/2021 7:13 AM CST Peripheral Pulse Rate 72 bpm    Systolic Blood Pressure 122 mmHg    Diastolic Blood Pressure 74 mmHg    Mean Arterial Pressure 90 mmHg    Oxygen Saturation 97 %      Measurements from flowsheet : Measurements   2/10/2021 7:13 AM CST Height Measured - Standard 69 in    Weight Measured - Standard 235 lb    BSA 2.28 m2    Body Mass Index 34.7 kg/m2  HI      General:  Alert and oriented, No acute distress.    Eye:  Pupils are equal, round and reactive to light,  Extraocular movements are intact, Normal conjunctiva.    HENT:  Normocephalic, Tympanic membranes are clear, Normal hearing, Oral mucosa is moist, No pharyngeal erythema.    Neck:  Supple, Non-tender, No carotid bruit, No jugular venous distention, No lymphadenopathy, No thyromegaly.    Respiratory:  Lungs are clear to auscultation, Respirations are non-labored, Breath sounds are equal, Symmetrical chest wall expansion, No chest wall tenderness.    Cardiovascular:  Normal rate, Regular rhythm, No murmur, No gallop, Good pulses equal in all extremities, Normal peripheral perfusion, No edema.    Gastrointestinal:  Soft, Non-tender, Non-distended, Normal bowel sounds, No organomegaly.    Lymphatics:  No lymphadenopathy neck, axilla, groin.    Musculoskeletal:  Normal range of motion, Normal strength, No tenderness, No swelling, No deformity, Normal gait.    Integumentary:  Warm, Dry, Pink.    Neurologic:  Normal sensory, Normal motor function, No focal deficits, Cranial Nerves II-XII are grossly intact, Normal deep tendon reflexes.    Cognition and Speech:  Oriented, Speech clear and coherent, Functional cognition intact,     What is the year? 2021    What is the date? 02/10    What city do you live in? [place]         .    Psychiatric:  Cooperative, Appropriate mood & affect, Normal judgment, PHQ 9  and cage scores reviewed and discussed with patient..       Review / Management   Differential diagnosis   Results review

## 2022-02-15 NOTE — LETTER
(Inserted Image. Unable to display)   [facility] [address]  March 28, 2019      ABELARDO TINOCO   [place], [place] [id number]        Dear ABELARDO,     Thank you for selecting [facility] for your healthcare needs. Below you will find the results of the recent tests done at our clinic.      Glucose was a little elevated and we need to keep our eye on that going forward.  It would help to eliminate most sweets from the diet.  Everything else was within acceptable limits.      Result Name Current Result Previous Result Reference Range   Sodium Level (mmol/L)  139 3/27/2019  142 12/10/2018 135 - 146   Potassium Level (mmol/L)  3.7 3/27/2019  4.5 12/10/2018 3.5 - 5.3   Chloride Level (mmol/L)  105 3/27/2019  108 12/10/2018 98 - 110   CO2 Level (mmol/L)  24 3/27/2019  28 12/10/2018 20 - 32   Glucose Level (mg/dL) ((H)) 143 3/27/2019  88 12/10/2018 65 - 99   BUN (mg/dL)  20 3/27/2019  20 12/10/2018 7 - 25   Creatinine Level (mg/dL)  1.23 3/27/2019  1.21 12/10/2018 0.70 - 1.25   BUN/Creat Ratio  NOT APPLICABLE 3/27/2019  NOT APPLICABLE 12/10/2018 6 - 22   eGFR (mL/min/1.73m2)  61 3/27/2019  62 12/10/2018 > OR = 60 -    eGFR  (mL/min/1.73m2)  70 3/27/2019  72 12/10/2018 > OR = 60 -    Calcium Level (mg/dL)  9.0 3/27/2019  9.0 12/10/2018 8.6 - 10.3   Bilirubin Total (mg/dL)  0.7 3/27/2019  0.4 12/10/2018 0.2 - 1.2   Alkaline Phosphatase (unit/L)  87 3/27/2019  76 12/10/2018 40 - 115   AST/SGOT (unit/L)  25 3/27/2019  22 12/10/2018 10 - 35   ALT/SGPT (unit/L)  17 3/27/2019  16 12/10/2018 9 - 46   Protein Total (gm/dL)  6.9 3/27/2019  7.0 12/10/2018 6.1 - 8.1   Albumin Level (gm/dL)  4.3 3/27/2019  4.2 12/10/2018 3.6 - 5.1   Globulin  2.6 3/27/2019  2.8 12/10/2018 1.9 - 3.7   A/G Ratio  1.7 3/27/2019  1.5 12/10/2018 1.0 - 2.5   Cholesterol (mg/dL)  182 3/27/2019 ((H)) 265 12/10/2018  - <200   Non-HDL Cholesterol ((H)) 136 3/27/2019 ((H)) 217 12/10/2018  -  <130   HDL (mg/dL)  46 3/27/2019  48 12/10/2018 >40 -    Cholesterol/HDL Ratio  4.0 3/27/2019 ((H)) 5.5 12/10/2018  - <5.0   LDL ((H)) 107 3/27/2019 ((H)) 188 12/10/2018    Triglyceride (mg/dL) ((H)) 177 3/27/2019  144 12/10/2018  - <150   WBC  7.3 3/27/2019  5.9 12/10/2018 3.8 - 10.8   RBC  4.91 3/27/2019  5.01 12/10/2018 4.20 - 5.80   Hgb (gm/dL)  15.5 3/27/2019  16.0 12/10/2018 13.2 - 17.1   Hct (%)  43.5 3/27/2019  45.0 12/10/2018 38.5 - 50.0   MCV (fL)  88.6 3/27/2019  89.8 12/10/2018 80.0 - 100.0   MCH (pg)  31.6 3/27/2019  31.9 12/10/2018 27.0 - 33.0   MCHC (gm/dL)  35.6 3/27/2019  35.6 12/10/2018 32.0 - 36.0   RDW (%)  13.1 3/27/2019  12.8 12/10/2018 11.0 - 15.0   Platelet  291 3/27/2019  289 12/10/2018 140 - 400   MPV (fL)  10.4 3/27/2019  10.1 12/10/2018 7.5 - 12.5       Please contact my practice at [phone]  if you have any questions or concerns.     Sincerely,        Osman Courtney MD          What do your labs mean?  Below is a glossary of commonly ordered labs:  LDL   Bad Cholesterol   HDL   Good Cholesterol  AST/ALT   Liver Function   Cr/Creatinine   Kidney Function  Microalbumin   Kidney Function  BUN   Kidney Function  PSA   Prostate    TSH   Thyroid Hormone  HgbA1c   Diabetes Test   Hgb (Hemoglobin)   Red Blood Cells

## 2022-02-15 NOTE — PROGRESS NOTES
Patient:   ABELARDO TINOCO            MRN: [id number]            FIN: [id number]               Age:   67 years     Sex:  Male     :  1950   Associated Diagnoses:   Bronchitis   Author:   Osman Courtney MD      Impression and Plan   Diagnosis     Bronchitis (ZBJ43-YL J20.8).     Course:  Worsening.    Orders     Orders   Charges (Evaluation and Management):  09329 office outpatient visit 15 minutes (Charge) (Order): Quantity: 1, Bronchitis.     Orders (Selected)   Prescriptions  Prescribed  Levaquin 500 mg oral tablet: 1 tab(s) ( 500 mg ), PO, q24hr, # 10 tab(s), 0 Refill(s), Type: Maintenance, Pharmacy: [facility], 1 tab(s) po q 24 hrs,x10 day(s)  codeine-guaifenesin 10 mg-100 mg/5 mL oral syrup: 10 mL, PO, q4hr, PRN: for cough, # 240 mL, 0 Refill(s), Type: Maintenance, Pharmacy: [facility], 10 mL po q4 hrs,PRN:for cough  predniSONE 20 mg oral tablet: 1 tab(s) ( 20 mg ), PO, bid, # 14 tab(s), 0 Refill(s), Type: Maintenance, Pharmacy: [facility], 1 tab(s) po bid,x7 day(s).        Visit Information      Date of Service: 2018 09:42 am  Performing Location: [facility]  Encounter#: [id number]      Primary Care Provider (PCP):  Osman Courtney MD    NPI# [id number]   Visit type:  New symptom.    Accompanied by:  No one.    Source of history:  Self.    History limitation:  None.       Chief Complaint   Chief complaint discussed and confirmed correct.     3/5/2018 9:44 AM CST     Pt here for cough        History of Present Illness             The patient presents with cough.  The cough is described as paroxysmal and productive yellow sputum.  The severity of the cough is severe.  The cough is worsening.  The cough has lasted for 1 week(s).  The context of the cough: occurred in association with illness.  There are no modifying factors.  Associated symptoms consist of fever and shortness of breath.        Review of Systems   Constitutional:  Negative.    Eye:  Negative.     Ear/Nose/Mouth/Throat:  Negative.    Respiratory:  Cough.    Cardiovascular:  Negative.    Gastrointestinal:  Negative.    Genitourinary:  Negative.    Hematology/Lymphatics:  Negative.    Endocrine:  Negative.    Immunologic:  Negative.    Musculoskeletal:  Negative.    Integumentary:  Negative.    Neurologic:  Negative.    Psychiatric:  Negative.          All other systems reviewed and negative      Health Status   Allergies:    Allergic Reactions (Selected)  No known allergies   Medications:  (Selected)   Prescriptions  Prescribed  Levaquin 500 mg oral tablet: 1 tab(s) ( 500 mg ), PO, q24hr, # 10 tab(s), 0 Refill(s), Type: Maintenance, Pharmacy: [facility], 1 tab(s) po q 24 hrs,x10 day(s)  aspirin 81 mg oral tablet: 1 tab(s) ( 81 mg ), po, daily, # 30 tab(s), 11 Refill(s), Type: Maintenance, patient has supply at home (Rx)  atorvastatin 40 mg oral tablet: 1 tab(s) ( 40 mg ), PO, Daily, # 90 tab(s), 1 Refill(s), Type: Maintenance  codeine-guaifenesin 10 mg-100 mg/5 mL oral syrup: 10 mL, PO, q4hr, PRN: for cough, # 240 mL, 0 Refill(s), Type: Maintenance, Pharmacy: [facility], 10 mL po q4 hrs,PRN:for cough  hydrochlorothiazide-triamterene 25 mg-37.5 mg oral tablet: 1 tab(s), po, daily, # 90 tab(s), 1 Refill(s), Type: Maintenance, Pharmacy: [facility], 1 tab(s) po daily  predniSONE 20 mg oral tablet: 1 tab(s) ( 20 mg ), PO, bid, # 14 tab(s), 0 Refill(s), Type: Maintenance, Pharmacy: [facility], 1 tab(s) po bid,x7 day(s)  raNITIdine 75 mg oral tablet: 1 tab(s) ( 75 mg ), PO, daily, # 30 tab(s), 11 Refill(s), Type: Maintenance, Pharmacy: [facility], 1 tab(s) po daily,x30 day(s)   Problem list:    All Problems  Adenomatous polyp of colon / SNOMED CT 4164952895 / Confirmed  Benign essential HTN / SNOMED CT 6638138 / Confirmed  Chronic GERD / SNOMED CT 2634214989 / Confirmed  Obesity / ICD-9-.00 / Probable  Other allergic rhinitis / SNOMED CT 126854001 / Confirmed  Resolved:  Familial Hyperlipidemia / ICD-9-.4      Histories   Past Medical History:    Resolved  Familial Hyperlipidemia (272.4):  Resolved.   Family History:    Cancer  Mother ()  Grandmother (M)     Procedure history:    Colonoscopy (SNOMED CT 005477219) performed by Ty Somers MD on 2018 at 67 Years.  Comments:  2018 3:34 PM - Ty Somers MD  Indication: Screening  Sedation: Fentanyl 100 mcg, Versed 2 mg  Findings: One tubular adenoma, diverticulosis  Rec: Repeat in 5 years   Social History:        Alcohol Assessment: Low Risk      Tobacco Assessment: Past                     Comments:                      2012 - Merline ESPAÑA Viv                     Quit x 20 yrs      Substance Abuse Assessment: Denies Substance Abuse      Employment and Education Assessment            Employed                     Comments:                      2012 - Darryl Stark MD      Home and Environment Assessment            Marital status: .      Exercise and Physical Activity Assessment: Does not exercise      Sexual Assessment            Sexually active: Yes.  Sexual orientation: Heterosexual.        Physical Examination   Vital signs reviewed  and within acceptable limits    Vital Signs   3/5/2018 9:44 AM CST Temperature Tympanic 97.9 DegF    Peripheral Pulse Rate 94 bpm    Systolic Blood Pressure 136 mmHg  HI    Diastolic Blood Pressure 74 mmHg    Mean Arterial Pressure 95 mmHg    BP Site Left arm    Oxygen Saturation 97 %      Measurements from flowsheet : Measurements   3/5/2018 9:44 AM CST Height Measured - Standard 69 in    Weight Measured - Standard 234.6 lb    BSA 2.27 m2    Body Mass Index 34.64 kg/m2  HI      General:  Alert and oriented, No acute distress.    Eye:  Pupils are equal, round and reactive to light, Extraocular movements are intact, Normal conjunctiva.    HENT:  Normocephalic, Tympanic membranes are clear, Normal hearing, Oral mucosa is  moist, No pharyngeal erythema, No sinus tenderness.    Neck:  Supple, Non-tender, No lymphadenopathy, No thyromegaly.    Respiratory:  Lungs are clear to auscultation, Respirations are non-labored, Breath sounds are equal, demonstrates frequent loose cough.    Cardiovascular:  Normal rate, Regular rhythm, No murmur, Normal peripheral perfusion, No edema.    Integumentary:  Warm, Dry, Pink.    Psychiatric:  Cooperative, Appropriate mood & affect, Normal judgment.

## 2022-02-15 NOTE — LETTER
(Inserted Image. Unable to display)       October 03, 2019      ABELARDO TINOCO   [place], [place] [id number]          Dear ABELARDO,      Thank you for selecting [facility] for your healthcare needs.     Our records indicate you are due for the following services:    Fasting Lab Tests ~ Please do not eat or drink anything 10 hours prior to your scheduled appointment time.  (Water and any medications that you may need are allowed unless directed otherwise.)    If you had your labs done at another facility or with Direct Access Lab Testing at [facility], please bring in a copy of the results to your next visit, mail a copy, or drop off a copy of your results to your Healthcare Provider.    Follow-up Office Visit  Hypertension Check ~ Please remember to bring any at-home blood pressure readings with you to your appointment.    You are due for lab work and an office visit; please schedule the lab appointment 1 week before the office visit.  This will assure all results are available to discuss with your Healthcare Provider during your visit.    **It is very helpful if you bring your medication bottles to your appointment.  This assures we have all of your current medications, including strength and dosing information, documented accurately in your medical record.    To schedule an appointment or if you have further questions, please contact your primary clinic:   [facility]       [phone]   Critical access hospital       [phone]              [facility]     [phone]    Powered by [employer]    Sincerely,    Osman Courtney MD

## 2022-02-15 NOTE — TELEPHONE ENCOUNTER
Entered by Clarita Espinoza CMA on April 08, 2019 12:20:32 PM CDT  ---------------------  From: Clarita Espinoza CMA   To: [facility]    Sent: 4/8/2019 12:20:32 PM CDT  Subject: Medication Management     ** Submitted: **  Order:hydrochlorothiazide-triamterene (hydrochlorothiazide-triamterene 25 mg-37.5 mg oral tablet)  1 tab(s)  Oral  daily  Qty:  90 tab(s)        Refills:  1          Substitutions Allowed     Route To [facility]    Signed by Clarita Espinoza CMA  4/8/2019 12:20:00 PM    ** Submitted: **  Complete:hydrochlorothiazide-triamterene (hydrochlorothiazide-triamterene 25 mg-37.5 mg oral tablet)   Signed by Clarita Espinoza CMA  4/8/2019 12:20:00 PM    ** Not Approved:  **  hydrochlorothiazide-triamterene (Triamterene-HCTZ Tablet 37.5-25 MG)  Take one (1) tablet daily NEED TO SEE DR  Qty:  30 EA        Days Supply:  0        Refills:  0          Substitutions Allowed     Route To [facility]   Signed by Clarita Espinoza CMA            ** Patient matched by Clarita Espinoza CMA on 4/8/2019 12:07:22 PM CDT **      ------------------------------------------  From: [facility]  To: Osman Courtney MD  Sent: April 8, 2019 10:23:00 AM CDT  Subject: Medication Management  Due: April 9, 2019 10:23:00 AM CDT    ** On Hold Pending Signature **  Drug: hydrochlorothiazide-triamterene (hydrochlorothiazide-triamterene 25 mg-37.5 mg oral tablet)  Take one (1) tablet daily NEED TO SEE DR  Quantity: 30 EA       Days Supply: 0         Refills: 0  Substitutions Allowed  Notes from Pharmacy:     Dispensed Drug: hydrochlorothiazide-triamterene (hydrochlorothiazide-triamterene 25 mg-37.5 mg oral tablet)  Take one (1) tablet daily NEED TO SEE DR  Quantity: 30 EA       Days Supply: 0         Refills: 0  Substitutions Allowed  Notes from Pharmacy:   ------------------------------------------

## 2022-02-15 NOTE — LETTER
(Inserted Image. Unable to display)         May 13, 2020      ABELARDO TINOCO   [place], [place] [id number]        Dear ABELARDO,    You are due for medication check with Dr Courtney.  Video/telephone visits are available at this time.   Dr Courtney will refill your medication at that appointment.      Make sure that you schedule your appointment before you run out of medication to ensure you're not going without any medication.    You may call  [phone] to schedule your video/telephone visit with Dr Sherwin Henderson, CMA with Dr Courtney

## 2022-02-15 NOTE — NURSING NOTE
Comprehensive Intake Entered On:  8/6/2021 1:17 PM CDT    Performed On:  8/6/2021 1:15 PM CDT by Helen Lamar CMA               Summary   Chief Complaint :   f/u chronic - establish care   Advance Directive :   No   Weight Measured :   232.2 lb(Converted to: 232 lb 3 oz, 105.324 kg)    Height Measured :   69 in(Converted to: 5 ft 9 in, 175.26 cm)    Body Mass Index :   34.29 kg/m2 (HI)    Body Surface Area :   2.26 m2   Systolic Blood Pressure :   119 mmHg   Diastolic Blood Pressure :   83 mmHg (HI)    Mean Arterial Pressure :   95 mmHg   Peripheral Pulse Rate :   68 bpm   Temperature Tympanic :   97.2 DegF(Converted to: 36.2 DegC)  (LOW)    Oxygen Saturation :   97 %   Helen Lamar CMA - 8/6/2021 1:15 PM CDT   Health Status   Allergies Verified? :   Yes   Medication History Verified? :   Yes   Medical History Verified? :   Yes   Pre-Visit Planning Status :   Completed   Tobacco Use? :   Former smoker   Helen Lamar CMA - 8/6/2021 1:15 PM CDT   Social History   Social History   (As Of: 8/6/2021 1:17:51 PM CDT)   Alcohol:  Low Risk      (Last Updated: 3/28/2012 10:21:56 AM CDT by Darryl Stark MD )         Tobacco:  Past      Former smoker, quit more than 30 days ago   (Last Updated: 2/10/2021 7:14:54 AM CST by Clarita Espinoza CMA)          Electronic Cigarette/Vaping:        Electronic Cigarette Use: Never.   (Last Updated: 2/10/2021 7:14:54 AM CST by Clarita Espinoza CMA)          Substance Abuse:  Denies Substance Abuse      (Last Updated: 3/28/2012 10:21:58 AM CDT by Darryl Stark MD )         Employment/School:        Retired   Comments:  7/3/2018 9:05 AM - Osman Courtney MD: Retired 3/28/2012 10:35 AM - Darryl Stark MD:    (Last Updated: 7/3/2018 9:05:37 AM CDT by Osman Courtney MD)          Home/Environment:        Marital status: .   (Last Updated: 3/28/2012 2:41:20 PM CDT by Andrés Morse CMA)          Exercise:  Does not exercise      (Last Updated: 3/28/2012 2:40:58 PM CDT by Lito  Andrés ESPAÑA )         Sexual:        Sexually active: Yes.  Sexual orientation: Heterosexual.   (Last Updated: 3/28/2012 2:40:46 PM CDT by Andrés Morse CMA)

## 2022-02-24 ENCOUNTER — COMMUNICATION - RIVER FALLS (OUTPATIENT)
Dept: FAMILY MEDICINE | Facility: CLINIC | Age: 72
End: 2022-02-24

## 2022-02-24 ENCOUNTER — OFFICE VISIT - RIVER FALLS (OUTPATIENT)
Dept: FAMILY MEDICINE | Facility: CLINIC | Age: 72
End: 2022-02-24

## 2022-02-24 ENCOUNTER — LAB REQUISITION (OUTPATIENT)
Dept: LAB | Facility: CLINIC | Age: 72
End: 2022-02-24
Payer: MEDICARE

## 2022-02-24 DIAGNOSIS — U07.1 COVID-19: ICD-10-CM

## 2022-02-24 PROCEDURE — U0003 INFECTIOUS AGENT DETECTION BY NUCLEIC ACID (DNA OR RNA); SEVERE ACUTE RESPIRATORY SYNDROME CORONAVIRUS 2 (SARS-COV-2) (CORONAVIRUS DISEASE [COVID-19]), AMPLIFIED PROBE TECHNIQUE, MAKING USE OF HIGH THROUGHPUT TECHNOLOGIES AS DESCRIBED BY CMS-2020-01-R: HCPCS | Mod: ORL | Performed by: INTERNAL MEDICINE

## 2022-02-25 ENCOUNTER — COMMUNICATION - RIVER FALLS (OUTPATIENT)
Dept: FAMILY MEDICINE | Facility: CLINIC | Age: 72
End: 2022-02-25
Payer: COMMERCIAL

## 2022-02-25 LAB — SARS-COV-2 RNA RESP QL NAA+PROBE: NEGATIVE

## 2022-02-26 LAB — SARS-COV-2 RNA RESP QL NAA+PROBE: NEGATIVE

## 2022-03-02 VITALS
TEMPERATURE: 98.2 F | OXYGEN SATURATION: 97 % | HEART RATE: 89 BPM | BODY MASS INDEX: 34.61 KG/M2 | DIASTOLIC BLOOD PRESSURE: 78 MMHG | WEIGHT: 234.4 LBS | SYSTOLIC BLOOD PRESSURE: 122 MMHG

## 2022-03-17 NOTE — PROGRESS NOTES
Patient:   THIAGO TINOCO            MRN: [id number]            FIN: [id number]               Age:   71 years     Sex:  Male     :  1950   Associated Diagnoses:   Benign essential HTN; Chronic GERD; HLD (hyperlipidemia)   Author:   Harsh Rivera MD      Visit Information      Date of Service: 2022 01:29 pm  Performing Location: [facility]  Encounter#: [id number]      Primary Care Provider (PCP):  Harsh Rivera MD    NPI# [id number]      Referring Provider:  Harsh Rivera MD    NPI# [id number]      Chief Complaint   2022 1:44 PM CST    1.  started not feeling well  with sneezing, runny nose, sl temp  yesterday moved into chest with SOB pains in sides/back with coughing,.  Had a neg home test yesterday.  2.  due for chronic disease f/u              Additional Information:No additional information recorded during visit.   Chief complaint and symptoms as noted above and confirmed with patient.  Recent lab and diagnostic studies reviewed with patient      History of Present Illness   2021: Thiago presents to clinic to establish care, accompanied by his wife Corina.  Previously followed with Dr. Courtney.  Generally describe himself as healthy.  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  Has a history of hypertension hyperlipidemia.  Currently on atorvastatin 20 mg daily; he seems to remember having more myalgia complaints on higher dose of 40 mg though not clearly was implicated to the medication or not.  Some skin question concerns related to fair skin.  He has no history of skin cancer himself.  2022: Thiago presents with 5-day history of upper respiratory tract symptoms including sore throat nasal congestion fever, and since developed nonproductive cough and dyspnea.  No described sick contacts.  Has completed home Covid test yesterday which was negative.  Has completed full series of Covid vaccinations and booster this past fall.         Review of Systems    Constitutional:  No fever, No chills.    Eye:  Negative except as documented in history of present illness.    Ear/Nose/Mouth/Throat:  Nasal congestion, Sore throat.    Respiratory:  Shortness of breath, Cough, No sputum production, No hemoptysis, No wheezing.    Cardiovascular:  No chest pain, No palpitations, No peripheral edema, No syncope.    Gastrointestinal:  No nausea, No vomiting, No abdominal pain.    Genitourinary:  No dysuria, No hematuria.    Hematology/Lymphatics:  Negative except as documented in history of present illness.    Endocrine:  No excessive thirst, No polyuria.    Immunologic:  No recurrent fevers.    Musculoskeletal:  No joint pain, No muscle pain.    Integumentary:  Skin lesion.    Neurologic:  Alert and oriented X4, No numbness, No tingling, No headache.       Health Status   Allergies:    Allergic Reactions (Selected)  No Known Medication Allergies   Medications:  (Selected)   Prescriptions  Prescribed  aspirin 81 mg oral tablet: 1 tab(s) ( 81 mg ), po, daily, # 30 tab(s), 11 Refill(s), Type: Maintenance, patient has supply at home (Rx)  atorvastatin 20 mg oral tablet: = 2 tab(s) ( 40 mg ), Oral, daily, # 180 tab(s), 3 Refill(s), Type: Maintenance, Pharmacy: [facility], 2 tab(s) Oral daily, 69, in, 08/06/21 13:15:00 CDT, Height Measured, 232.2, lb, 08/06/21 13:15:00 CDT, Weight Measured  famotidine 40 mg oral tablet: = 1 tab(s) ( 40 mg ), Oral, hs, # 90 tab(s), 1 Refill(s), Type: Maintenance, Pharmacy: [facility], 1 tab(s) Oral hs, 69, in, 02/10/21 7:13:00 CST, Height Measured, 235, lb, 02/10/21 7:24:00 CST, Weight Measured  fluorouracil 5% topical cream: See Instructions, Instructions: as directed by physician, # 40 gm, 0 Refill(s), Type: Soft Stop, Pharmacy: [facility], as directed by physician, 69, in, 08/06/21 13:15:00 CDT, Height Measured, 232.2, lb, 08/06/21 13:15:00 CDT, Weight Measured  hydrochlorothiazide-triamterene 25 mg-37.5 mg oral tablet: 1 tab(s),  Oral, daily, # 90 tab(s), 1 Refill(s), Type: Maintenance, Pharmacy: [facility], 1 tab(s) Oral daily, 69, in, 21 13:15:00 CDT, Height Measured, 232.2, lb, 21 13:15:00 CDT, Weight Measured,    Medications          *denotes recorded medication          aspirin 81 mg oral tablet: 81 mg, 1 tab(s), po, daily, 30 tab(s), 11 Refill(s).          atorvastatin 20 mg oral tablet: 40 mg, 2 tab(s), Oral, daily, 180 tab(s), 3 Refill(s).          famotidine 40 mg oral tablet: 40 mg, 1 tab(s), Oral, hs, 90 tab(s), 1 Refill(s).          fluorouracil 5% topical cream: See Instructions, as directed by physician, 40 gm, 0 Refill(s).          hydrochlorothiazide-triamterene 25 mg-37.5 mg oral tablet: 1 tab(s), Oral, daily, 90 tab(s), 1 Refill(s).       Problem list:    All Problems  Adenomatous polyp of colon / SNOMED CT 2636110898 / Confirmed  Other allergic rhinitis / SNOMED CT 207223488 / Confirmed  Benign essential HTN / SNOMED CT 4769241 / Confirmed  Chronic GERD / SNOMED CT 5690861816 / Confirmed  HLD (hyperlipidemia) / SNOMED CT 50934250 / Confirmed  Obesity / ICD-9-.00 / Probable  Tear of meniscus of right knee / SNOMED CT 009642089 / Confirmed  Resolved: Familial Hyperlipidemia / ICD-9-.4      Histories   Past Medical History:    Resolved  Familial Hyperlipidemia (272.4):  Resolved.   Family History:    Cancer  Mother ()  Grandmother (M)     Procedure history:    Bucket handle tear of lateral meniscus of knee (334496438) on 2018 at 67 Years.  Comments:  2018 2:08 PM CDT - Elizabeth Armstrong.  Colonoscopy (063767092) on 2018 at 67 Years.  Comments:  2018 3:34 PM [facility]SM[facility] - Ty Somers MD  Indication: Screening  Sedation: Fentanyl 100 mcg, Versed 2 mg  Findings: One tubular adenoma, diverticulosis  Rec: Repeat in 5 years  Tonsillectomy and adenoidectomy (741011130).   Social History:        Electronic Cigarette/Vaping Assessment            Electronic Cigarette Use:  Never.      Alcohol Assessment: Low Risk      Tobacco Assessment: Past            Former smoker, quit more than 30 days ago      Substance Abuse Assessment: Denies Substance Abuse      Employment and Education Assessment            Retired                     Comments:                      07/03/2018 - Sherwin IGLESIAS, Osman                     Retired                       03/28/2012 - Mi IGLESIAS, Darryl bower      Home and Environment Assessment            Marital status: .      Exercise and Physical Activity Assessment: Does not exercise      Sexual Assessment            Sexually active: Yes.  Sexual orientation: Heterosexual.        Physical Examination   vital signs stable, as noted above   Vital Signs   2/24/2022 1:44 PM CST Temperature Tympanic 98.2 DegF    Peripheral Pulse Rate 89 bpm    Systolic Blood Pressure 122 mmHg    Diastolic Blood Pressure 78 mmHg    Mean Arterial Pressure 93 mmHg    Oxygen Saturation 97 %      Measurements from flowsheet : Measurements   2/24/2022 1:44 PM CST    Weight Measured - Standard                234.4 lb     General:  Alert and oriented, No acute distress.    Eye:  Extraocular movements are intact, Normal conjunctiva.    HENT:  Normocephalic, Tympanic membranes are clear, Oral mucosa is moist, No pharyngeal erythema.    Neck:  Supple, No carotid bruit, No jugular venous distention, No lymphadenopathy.    Respiratory:  Lungs are clear to auscultation, Respirations are non-labored.    Cardiovascular:  Normal rate, Regular rhythm, No murmur, No edema.    Gastrointestinal:  Soft, Non-tender, Non-distended, Normal bowel sounds.    Musculoskeletal:  Normal range of motion, Normal strength, No tenderness.    Integumentary:  actinic keratoses seen on left cheek.    Neurologic:  Alert, Oriented, Normal motor function, No focal deficits.    Cognition and Speech:  Oriented, Speech clear and coherent.    Psychiatric:  Appropriate mood & affect.       Review /  Management   Results review      Impression and Plan   Diagnosis     Benign essential HTN (VZQ99-VG I10).     Chronic GERD (IDK66-AZ K21.9).     HLD (hyperlipidemia) (FJH63-ER E78.5).         .) acute URI, flu-like symptoms  - reassuring vitals; normal SpO2  - home COVID Ag test (2/23): negative  - CXR: appearing clear per my read  - influenza Ag: negative  - CBC: normal WBC  - obtaining COVID PCR  - reassurance given - advised to continue monitoring symptoms    plan as previously outlined and not discussed at today's visit     .) Hypertension, controlled  current antihypertensive regimen: hctz/triamterene 25/37.5mg daily  regimen changes: none  intolerance:  future titration/work-up plan:     .) hyperlipidemia  - on atorvastatin 20mg qhs  - LDL historically >100   - I'd like him to try and increase atorvastatin to 40mg qhs    .) GERD, controlled  - on famotidine 40mg daily    .) health maintenance  - last colonoscopy in '18 (tubular adenoma) - due in '23  - historically has not pursued PSA testing  - adult vaccinations up to date; including COVID vaccine  - h/o actinic keratoses on scalp/face   - cryotherapy to facial/cheek lesion   - renewed Efudex cream for scalp   - consideration for future dermatology referral     RTC in 6 months; AWV with fasting BMP, lipid panel

## 2022-03-17 NOTE — LETTER
(Inserted Image. Unable to display)   February 11, 2022  [place] [address]        Dear ABELARDO,    Thank you for selecting [facility] for your healthcare needs.    Our records indicate you are due for the following services:     Annual Wellness Visit    (FYI   Regarding office visits: In some instances, a video visit or telephone visit may be offered as an option.)    To schedule an appointment or if you have further questions, please contact your clinic at [phone].    Powered by [employer]    Sincerely,    Harsh Rivera MD

## 2022-03-17 NOTE — TELEPHONE ENCOUNTER
---------------------  From: Rosina Ramsay RN (Phone Messages Pool ([id number]_[facility]))   Sent: 2/24/2022 8:16:40 AM CST  Subject: pneumonia sxs       PCP: AMNAUEL      Time of Call:  7:37am       Person Calling:  pt  Phone number:  [phone]    Returned call at: 8:09am    Note:   VM received from pt, stating he needs a visit. Has cold sxs and now has settled into his chest.   TC with pt, who stated he started having sneezing and coughing on Sunday. Last noc he could not sleep due pain in the chest and lungs. Took a home covid test yesterday and was negative. Pt stated he has hx of pneumonia. Pt transferred to scheduling.    Last office visit and reason:  8/6/21 establish care BRISSAC

## 2022-03-17 NOTE — NURSING NOTE
Comprehensive Intake Entered On:  2022 1:46 PM CST    Performed On:  2022 1:44 PM CST by Helen Lamar CMA               Summary   Chief Complaint :   1.  started not feeling well  with sneezing, runny nose, sl temp  yesterday moved into chest with SOB pains in sides/back with coughing,.  Had a neg home test yesterday.  2.  due for chronic disease f/u    Advance Directive :   No   Weight Measured :   234.4 lb(Converted to: 234 lb 6 oz, 106.322 kg)    Systolic Blood Pressure :   122 mmHg   Diastolic Blood Pressure :   78 mmHg   Mean Arterial Pressure :   93 mmHg   Peripheral Pulse Rate :   89 bpm   Temperature Tympanic :   98.2 DegF(Converted to: 36.8 DegC)    Oxygen Saturation :   97 %   Helen Lamar CMA - 2022 1:44 PM CST   Health Status   Allergies Verified? :   Yes   Medication History Verified? :   Yes   Medical History Verified? :   Yes   Pre-Visit Planning Status :   Completed   Tobacco Use? :   Former smoker   Helen Lamar CMA - 2022 1:44 PM CST   Demographics   Last Name :   ALEJANDRINA   Address :   [address]   First Name :   ABELARDO Jasso Initial :   W   Responsible Party Date of Birth () :   1950 CDT   City :   [place]   State :   [place]   Zip Code :   [address]   Helen Lamar CMA - 2022 1:44 PM CST   Social History   Social History   (As Of: 2022 1:46:29 PM CST)   Alcohol:  Low Risk      (Last Updated: 3/28/2012 10:21:56 AM CDT by Darryl Stark MD )         Tobacco:  Past      Former smoker, quit more than 30 days ago   (Last Updated: 2022 1:46:17 PM CST by Helen Lamar CMA)          Electronic Cigarette/Vaping:        Electronic Cigarette Use: Never.   (Last Updated: 2022 1:46:20 PM CST by Helen Lamar CMA)          Substance Abuse:  Denies Substance Abuse      (Last Updated: 3/28/2012 10:21:58 AM CDT by Darryl Stark MD )         Employment/School:        Retired   Comments:  7/3/2018 9:05 AM - Osman Courtney MD: Retired 3/28/2012 10:35 AM  - Mi IGLESIAS, Darryl: cheli   (Last Updated: 7/3/2018 9:05:37 AM CDT by Osman Courtney MD)          Home/Environment:        Marital status: .   (Last Updated: 3/28/2012 2:41:20 PM CDT by Andrés Morse CMA)          Exercise:  Does not exercise      (Last Updated: 3/28/2012 2:40:58 PM CDT by Andrés Morse CMA )         Sexual:        Sexually active: Yes.  Sexual orientation: Heterosexual.   (Last Updated: 3/28/2012 2:40:46 PM CDT by Andrés Morse CMA)

## 2022-03-17 NOTE — LETTER
(Inserted Image. Unable to display)   [address] [place] [address]  February 25, 2022      [place] [address]        Dear ABELARDO,     Thank you for selecting [facility] (previously [facility]) for your healthcare needs. Below you will find the results of your recent test(s) done at our clinic.      Labs appear normal.       Result Name Current Result Previous Result Reference Range   Glucose Level (mg/dL)  83 2/24/2022  88 8/2/2021 65 - 99   BUN (mg/dL)  14 2/24/2022  21 8/2/2021 7 - 25   Creatinine Level (mg/dL)  1.22 2/24/2022  1.06 8/2/2021 0.70 - 1.25   eGFR (mL/min/1.73m2)  60 2/24/2022  71 8/2/2021 > OR = 60 -    eGFR  (mL/min/1.73m2)  70 2/24/2022  83 8/2/2021 > OR = 60 -    BUN/Creat Ratio  NOT APPLICABLE 2/24/2022  NOT APPLICABLE 8/2/2021 6 - 22   Sodium Level (mmol/L)  140 2/24/2022  140 8/2/2021 135 - 146   Potassium Level (mmol/L)  4.0 2/24/2022  4.4 8/2/2021 3.5 - 5.3   Chloride Level (mmol/L)  104 2/24/2022  104 8/2/2021 98 - 110   CO2 Level (mmol/L)  28 2/24/2022  28 8/2/2021 20 - 32   Calcium Level (mg/dL)  9.3 2/24/2022  9.3 8/2/2021 8.6 - 10.3       Please contact me or my assistant at [phone] if you have any questions or concerns.     Sincerely,        Harsh Rivera MD    What do your labs mean?  Below is a glossary of commonly ordered labs:  LDL - Bad Cholesterol  HDL - Good Cholesterol  AST/ALT - Liver Function  Cr/Creatinine - Kidney Function  Microalbumin - Kidney Function  BUN - Kidney Function  PSA - Prostate   TSH - Thyroid Hormone  HgbA1c - Diabetes Test  Hgb (Hemoglobin) - Red Blood Cells

## 2022-03-17 NOTE — TELEPHONE ENCOUNTER
Entered by Helen Lamar CMA on February 25, 2022 9:58:04 AM CST  ---------------------  From: Helen Lamar CMA   To: [facility]    Sent: 2/25/2022 9:58:04 AM CST  Subject: FW: Medication Management     ** Submitted: **  Order:hydrochlorothiazide-triamterene (hydrochlorothiazide-triamterene 25 mg-37.5 mg oral tablet)  1 tab(s)  Oral  daily  Qty:  90 tab(s)        Days Supply:  90        Refills:  1          Substitutions Allowed     Route To Pharmacy - [facility]    Signed by Helen Lamar CMA  2/25/2022 3:57:00 PM [facility]    ** Submitted: **  Complete:hydrochlorothiazide-triamterene (hydrochlorothiazide-triamterene 25 mg-37.5 mg oral tablet)   Signed by Helen Lamar CMA  2/25/2022 3:58:00 PM [facility]    ** Not Approved:  **  hydrochlorothiazide-triamterene (TRIAMT/HCTZ 37.5-25)  TAKE ONE (1) TABLET DAILY  Qty:  90 tab(s)        Days Supply:  90        Refills:  0          Substitutions Allowed     Route To [facility]   Signed by Helen Lamar CMA            ---------------------  From: Helen Lamar CMA (eRx Pool ([id number]))   To: [facility] Message Pool ([id number]);     Sent: 2/24/2022 6:30:43 AM CST  Subject: FW: Medication Management   Due Date/Time: 2/24/2022 10:52:00 AM CST             ** Patient matched by Helen Lamar CMA on 2/24/2022 6:30:35 AM CST **      ------------------------------------------  From: [facility]  To: Harsh Rivera MD  Sent: February 23, 2022 10:52:36 AM CST  Subject: Medication Management  Due: February 23, 2022 1:15:30 PM CST     ** On Hold Pending Signature **     Drug: hydrochlorothiazide-triamterene (hydrochlorothiazide-triamterene 25 mg-37.5 mg oral tablet), TAKE ONE (1) TABLET DAILY  Quantity: 90 tab(s)  Days Supply: 90  Refills: 0  Substitutions Allowed  Notes from Pharmacy:     Dispensed Drug: hydrochlorothiazide-triamterene (hydrochlorothiazide-triamterene 25 mg-37.5 mg oral tablet), TAKE ONE (1) TABLET DAILY  Quantity: 90  tab(s)  Days Supply: 90  Refills: 0  Substitutions Allowed  Notes from Pharmacy:  ------------------------------------------

## 2022-03-17 NOTE — TELEPHONE ENCOUNTER
---------------------  From: Irais ESPAÑAKami   Sent: 2/25/2022 5:23:37 PM CST  Subject: Covid Results     Notified pt of negative covid results. Declines needing an appt at this time- still has body aches. Encouraged to still wear mask in public as patient may still be contagious.

## 2022-07-28 ENCOUNTER — TRANSFERRED RECORDS (OUTPATIENT)
Dept: HEALTH INFORMATION MANAGEMENT | Facility: CLINIC | Age: 72
End: 2022-07-28

## 2022-08-16 PROBLEM — K21.9 GASTROESOPHAGEAL REFLUX DISEASE: Status: ACTIVE | Noted: 2022-08-16

## 2022-08-16 PROBLEM — S83.289A TEAR OF LATERAL MENISCUS OF KNEE: Status: RESOLVED | Noted: 2022-08-16 | Resolved: 2022-08-16

## 2022-08-16 PROBLEM — I10 BENIGN ESSENTIAL HYPERTENSION: Status: ACTIVE | Noted: 2022-08-16

## 2022-08-16 PROBLEM — D12.6 ADENOMATOUS POLYP OF COLON: Status: ACTIVE | Noted: 2022-08-16

## 2022-08-16 PROBLEM — E78.2 MIXED HYPERLIPIDEMIA: Status: ACTIVE | Noted: 2022-08-16

## 2022-08-16 PROBLEM — E66.9 OBESITY: Status: ACTIVE | Noted: 2022-08-16

## 2022-08-16 PROBLEM — S83.289A TEAR OF LATERAL MENISCUS OF KNEE: Status: ACTIVE | Noted: 2022-08-16

## 2022-08-16 PROBLEM — J30.9 ALLERGIC RHINITIS: Status: ACTIVE | Noted: 2022-08-16

## 2022-08-16 PROBLEM — Z00.00 HEALTH CARE MAINTENANCE: Status: ACTIVE | Noted: 2022-08-16

## 2022-08-16 PROBLEM — E78.5 HYPERLIPIDEMIA: Status: ACTIVE | Noted: 2022-08-16

## 2022-08-16 RX ORDER — ATORVASTATIN CALCIUM 20 MG/1
40 TABLET, FILM COATED ORAL
COMMUNITY
Start: 2021-08-06 | End: 2022-08-17

## 2022-08-16 RX ORDER — FAMOTIDINE 40 MG/1
20 TABLET, FILM COATED ORAL 2 TIMES DAILY
COMMUNITY
Start: 2021-02-10

## 2022-08-16 RX ORDER — TRIAMTERENE/HYDROCHLOROTHIAZID 37.5-25 MG
TABLET ORAL
COMMUNITY
Start: 2021-08-06 | End: 2022-08-17

## 2022-08-17 ENCOUNTER — OFFICE VISIT (OUTPATIENT)
Dept: FAMILY MEDICINE | Facility: CLINIC | Age: 72
End: 2022-08-17
Payer: COMMERCIAL

## 2022-08-17 VITALS
HEIGHT: 68 IN | WEIGHT: 229.4 LBS | OXYGEN SATURATION: 100 % | HEART RATE: 61 BPM | BODY MASS INDEX: 34.77 KG/M2 | SYSTOLIC BLOOD PRESSURE: 134 MMHG | TEMPERATURE: 96.2 F | DIASTOLIC BLOOD PRESSURE: 83 MMHG

## 2022-08-17 DIAGNOSIS — E78.2 MIXED HYPERLIPIDEMIA: ICD-10-CM

## 2022-08-17 DIAGNOSIS — Z00.00 HEALTH CARE MAINTENANCE: ICD-10-CM

## 2022-08-17 DIAGNOSIS — I10 BENIGN ESSENTIAL HYPERTENSION: ICD-10-CM

## 2022-08-17 DIAGNOSIS — Z13.6 SCREENING FOR AAA (ABDOMINAL AORTIC ANEURYSM): ICD-10-CM

## 2022-08-17 DIAGNOSIS — C44.310 BCC (BASAL CELL CARCINOMA), FACE: ICD-10-CM

## 2022-08-17 DIAGNOSIS — I20.89 STABLE ANGINA PECTORIS (H): Primary | ICD-10-CM

## 2022-08-17 DIAGNOSIS — K21.9 GASTROESOPHAGEAL REFLUX DISEASE WITHOUT ESOPHAGITIS: ICD-10-CM

## 2022-08-17 LAB
ANION GAP SERPL CALCULATED.3IONS-SCNC: 11 MMOL/L (ref 7–15)
BUN SERPL-MCNC: 24.4 MG/DL (ref 8–23)
CALCIUM SERPL-MCNC: 9.2 MG/DL (ref 8.8–10.2)
CHLORIDE SERPL-SCNC: 105 MMOL/L (ref 98–107)
CHOLEST SERPL-MCNC: 191 MG/DL
CREAT SERPL-MCNC: 1.22 MG/DL (ref 0.67–1.17)
DEPRECATED HCO3 PLAS-SCNC: 25 MMOL/L (ref 22–29)
GFR SERPL CREATININE-BSD FRML MDRD: 64 ML/MIN/1.73M2
GLUCOSE SERPL-MCNC: 93 MG/DL (ref 70–99)
HDLC SERPL-MCNC: 47 MG/DL
LDLC SERPL CALC-MCNC: 123 MG/DL
NONHDLC SERPL-MCNC: 144 MG/DL
POTASSIUM SERPL-SCNC: 4.2 MMOL/L (ref 3.4–5.3)
SODIUM SERPL-SCNC: 141 MMOL/L (ref 136–145)
TRIGL SERPL-MCNC: 105 MG/DL

## 2022-08-17 PROCEDURE — 99214 OFFICE O/P EST MOD 30 MIN: CPT | Mod: 25 | Performed by: INTERNAL MEDICINE

## 2022-08-17 PROCEDURE — G0439 PPPS, SUBSEQ VISIT: HCPCS | Performed by: INTERNAL MEDICINE

## 2022-08-17 PROCEDURE — 80061 LIPID PANEL: CPT | Performed by: INTERNAL MEDICINE

## 2022-08-17 PROCEDURE — 36415 COLL VENOUS BLD VENIPUNCTURE: CPT | Performed by: INTERNAL MEDICINE

## 2022-08-17 PROCEDURE — 80048 BASIC METABOLIC PNL TOTAL CA: CPT | Performed by: INTERNAL MEDICINE

## 2022-08-17 RX ORDER — CETIRIZINE HYDROCHLORIDE 10 MG/1
10 TABLET ORAL DAILY
COMMUNITY

## 2022-08-17 RX ORDER — TRIAMTERENE/HYDROCHLOROTHIAZID 37.5-25 MG
1 TABLET ORAL DAILY
Qty: 90 TABLET | Refills: 3 | Status: SHIPPED | OUTPATIENT
Start: 2022-08-17 | End: 2023-10-11

## 2022-08-17 RX ORDER — ATORVASTATIN CALCIUM 40 MG/1
40 TABLET, FILM COATED ORAL DAILY
Qty: 90 TABLET | Refills: 3 | Status: SHIPPED | OUTPATIENT
Start: 2022-08-17 | End: 2023-10-19

## 2022-08-17 ASSESSMENT — ENCOUNTER SYMPTOMS
NAUSEA: 0
SHORTNESS OF BREATH: 0
DYSURIA: 0
WEAKNESS: 0
DIZZINESS: 0
ARTHRALGIAS: 0
MYALGIAS: 0
DIARRHEA: 0
FEVER: 0
SORE THROAT: 0
JOINT SWELLING: 0
FREQUENCY: 1
EYE PAIN: 0
CONSTIPATION: 0
ABDOMINAL PAIN: 0
HEMATURIA: 0
COUGH: 0
NERVOUS/ANXIOUS: 0
CHILLS: 0
HEADACHES: 0
PARESTHESIAS: 0
HEMATOCHEZIA: 0
HEARTBURN: 0
PALPITATIONS: 0

## 2022-08-17 ASSESSMENT — LIFESTYLE VARIABLES
SKIP TO QUESTIONS 9-10: 1
HOW OFTEN DO YOU HAVE A DRINK CONTAINING ALCOHOL: 2-4 TIMES A MONTH
AUDIT-C TOTAL SCORE: 2
HOW MANY STANDARD DRINKS CONTAINING ALCOHOL DO YOU HAVE ON A TYPICAL DAY: 1 OR 2
HOW OFTEN DO YOU HAVE SIX OR MORE DRINKS ON ONE OCCASION: NEVER

## 2022-08-17 ASSESSMENT — ACTIVITIES OF DAILY LIVING (ADL): CURRENT_FUNCTION: NO ASSISTANCE NEEDED

## 2022-08-17 NOTE — PATIENT INSTRUCTIONS
59 yo F with PMH of HTN, DM2, and stroke (per son 2017) with left sided weakness who presented for RLE wound. Stroke Code was called for episode of AMS, patient not responding to commands which was not her baseline. NIHSS 23. No tpa given due to patient returning back to baseline, following commands. Concern for seizure with post ictal confusion. CTH: Hypodensity in the periventricular white matter, right thalamus, right basal ganglia, and right insular cortex likely representing ischemic change, age indeterminate. EEG: moderate right temporal slowing, repeat EEG: Focal and generalized slowing, Dilantin trough 8/9: 10.5, Dilantin 8/11: 8.5    Recommendation:  - inc Dilantin to 100mg/ 100mg/ 150mg    - cw Keppra 1500mg q12hrs   - TTE   - CT angio head and neck   - stroke consult  - Starting aspirin 81mg   - tele monitoring   - try to get records from [facility]     Thank you for the courtesy of this consult, Please contact us if any neurological changes or questions, neurology team will continue to follow. Recent chest pains that you describe are concerning for possible angina.  I do think we should make arrangements for repeat stress testing, similar to what he had 2 years ago.  In the interim, I would like you to retrial on baby aspirin while we explore this further    Also making arrangements for abdominal aortic aneurysm screening given your family history and remote tobacco use.  I am hopeful these can be scheduled at the same time    Consideration for both tetanus and shingles vaccines which would be administered through the pharmacy    Regarding your basal cell carcinoma, like we discussed in clinic, I think important to move forward with the planned full resection of your current lesion.  However, I do not think future basal cell carcinomas present as significant overall health risk.      Fasting labs today   59 yo F with PMH of HTN, DM2, and stroke (per son 2017) with left sided weakness who presented for RLE wound. Stroke Code was called for episode of AMS, patient not responding to commands which was not her baseline. NIHSS 23. No tpa given due to patient returning back to baseline, following commands. Concern for seizure with post ictal confusion. CTH: Hypodensity in the periventricular white matter, right thalamus, right basal ganglia, and right insular cortex likely representing ischemic change, age indeterminate. EEG: moderate right temporal slowing, repeat EEG: Focal and generalized slowing, Dilantin trough 8/9: 10.5, Dilantin 8/11: 8.5. MRI brain:: Multiple punctate cortical acute infarcts involving multiple vascular territories possibly related to embolic etiology. No evidence of acute hemorrhage. Moderate chronic microvascular type changes as well as chronic hemosiderin deposition within the right basal ganglia consistent with remote hemorrhage. Chronic right thalamic lacunar infarcts.    Recommendation:  - inc Dilantin to 100mg/ 100mg/ 150mg    - cw Keppra 1500mg q12hrs   - TTE   - CT angio head and neck   - stroke consult  - Starting aspirin 81mg   - tele monitoring   - try to get records from [facility] for previous hemorrhagic infarct    Thank you for the courtesy of this consult, Please contact us if any neurological changes or questions, neurology team will continue to follow. 59 yo F with PMH of HTN, DM2, and stroke (per son 2017) with left sided weakness who presented for RLE wound. Stroke Code was called for episode of AMS, patient not responding to commands which was not her baseline. NIHSS 23. No tpa given due to patient returning back to baseline, following commands. Concern for seizure with post ictal confusion. CTH: Hypodensity in the periventricular white matter, right thalamus, right basal ganglia, and right insular cortex likely representing ischemic change, age indeterminate. EEG: moderate right temporal slowing, repeat EEG: Focal and generalized slowing, Dilantin trough 8/9: 10.5, Dilantin 8/11: 8.5. MRI brain:: Multiple punctate cortical acute infarcts involving multiple vascular territories possibly related to embolic etiology. No evidence of acute hemorrhage. Moderate chronic microvascular type changes as well as chronic hemosiderin deposition within the right basal ganglia consistent with remote hemorrhage. Chronic right thalamic lacunar infarcts.  Recommendation:  - inc Dilantin to 100mg/ 100mg/ 150mg    - check dilantin trough keep b/w 10 - 20  - cw Keppra 1500mg q12hrs   - TTE > IVONNE  - CT angio head   - Starting aspirin 81mg /Plavix 75  - continue statins  - tele monitoring   - try to get records from [facility] to clarify prior stroke (? hemorrhagic vs ischemic)  - no bacteremia on multiple blood c/s  - consider hypercoagulable w/u   - PT/OT/SLP    Thank you for the courtesy of this consult, Please contact us if any neurological changes or questions, neurology team will continue to follow.

## 2022-08-17 NOTE — PROGRESS NOTES
"SUBJECTIVE:   Thiago Drake is a 72 year old male who presents for Preventive Visit.  Presents with several other concerns.  Describes an episode in [place] a few weeks past with exertional related chest pains lasting for approximately 10 to 15 seconds.  He says he just stopped what he was doing and rested and the feelings ultimately went away.  Has not had any further recurrence of similar feelings.  Did have what sounds like more of a screening nuclear stress testing approximately 2 years ago; denies any anginal complaints at that time.  Has tried aspirin in the past, limited by excessive bleeding from the skin.  Also had seen dermatology had a recent biopsy of left temple showing a basal cell carcinoma.  Was advised to follow-up for definitive resection.        Are you in the first 12 months of your Medicare coverage?  No    Healthy Habits:     In general, how would you rate your overall health?  Good    Frequency of exercise:  2-3 days/week    Duration of exercise:  Less than 15 minutes    Do you usually eat at least 4 servings of fruit and vegetables a day, include whole grains    & fiber and avoid regularly eating high fat or \"junk\" foods?  No    Taking medications regularly:  Yes    Medication side effects:  Not applicable    Ability to successfully perform activities of daily living:  No assistance needed    Home Safety:  No safety concerns identified    Hearing Impairment:  No hearing concerns    In the past 6 months, have you been bothered by leaking of urine? Yes    In general, how would you rate your overall mental or emotional health?  Good      PHQ-2 Total Score: 0    Additional concerns today:  Yes    Do you feel safe in your environment? Yes    Have you ever done Advance Care Planning? (For example, a Health Directive, POLST, or a discussion with a medical provider or your loved ones about your wishes): No, advance care planning information given to patient to review.  Patient plans to discuss their " wishes with loved ones or provider.          Hearing Assessment: abnormal--wears hearing aids   Fall risk  Fallen 2 or more times in the past year?: No  Any fall with injury in the past year?: No    Cognitive Screening   1) Repeat 3 items (Leader, Season, Table)    2) Clock draw:   NORMAL  3) 3 item recall: Recalls 3 objects  Results: 3 items recalled: COGNITIVE IMPAIRMENT LESS LIKELY    Mini-CogTM Copyright LATASHA Bridges. Licensed by the author for use in [facility]; reprinted with permission ([email or web address]). All rights reserved.      Do you have sleep apnea, excessive snoring or daytime drowsiness?: yes    Reviewed and updated as needed this visit by clinical staff   Tobacco  Allergies  Meds                Reviewed and updated as needed this visit by Provider                   Social History     Tobacco Use     Smoking status: Former Smoker     Packs/day: 1.00     Years: 20.00     Pack years: 20.00     Types: Cigarettes     Quit date:      Years since quittin.6     Smokeless tobacco: Never Used   Substance Use Topics     Alcohol use: Not on file         Alcohol Use 2022   Prescreen: >3 drinks/day or >7 drinks/week? No           PROBLEMS TO ADD ON...  Chest Pain  Duration of complaint:  - lasted for less than a minute, felt clammy/sweats    Basal cell carcinoma - recent treatment    Current providers sharing in care for this patient include:   Patient Care Team:  Ajith Rivera MD as PCP - General (HOSPITALIST)  Ajith Rivera MD as Assigned PCP    The following health maintenance items are reviewed in Epic and correct as of today:  Health Maintenance Due   Topic Date Due     ANNUAL REVIEW OF HM ORDERS  Never done     ADVANCE CARE PLANNING  Never done     HEPATITIS C SCREENING  Never done     DTAP/TDAP/TD IMMUNIZATION (1 - Tdap) Never done     ZOSTER IMMUNIZATION (1 of 2) Never done     AORTIC ANEURYSM SCREENING (SYSTEM ASSIGNED)  Never done             Review of Systems  "  Constitutional: Negative for chills and fever.   HENT: Positive for hearing loss. Negative for congestion, ear pain and sore throat.    Eyes: Negative for pain and visual disturbance.   Respiratory: Negative for cough and shortness of breath.    Cardiovascular: Positive for chest pain. Negative for palpitations and peripheral edema.   Gastrointestinal: Negative for abdominal pain, constipation, diarrhea, heartburn, hematochezia and nausea.   Genitourinary: Positive for frequency. Negative for dysuria, genital sores, hematuria, impotence and penile discharge.   Musculoskeletal: Negative for arthralgias, joint swelling and myalgias.   Skin: Negative for rash.   Neurological: Negative for dizziness, weakness, headaches and paresthesias.   Psychiatric/Behavioral: Negative for mood changes. The patient is not nervous/anxious.          OBJECTIVE:   /83   Pulse 61   Temp (!) 96.2  F (35.7  C) (Tympanic)   Ht 1.727 m (5' 8\")   Wt 104.1 kg (229 lb 6.4 oz)   SpO2 100%   BMI 34.88 kg/m   Estimated body mass index is 34.88 kg/m  as calculated from the following:    Height as of this encounter: 1.727 m (5' 8\").    Weight as of this encounter: 104.1 kg (229 lb 6.4 oz).  Physical Exam  GENERAL: healthy, alert and no distress  NECK: no adenopathy, no asymmetry, masses, or scars and thyroid normal to palpation  RESP: lungs clear to auscultation - no rales, rhonchi or wheezes  CV: regular rate and rhythm, normal S1 S2, no S3 or S4, no murmur, click or rub, no peripheral edema and peripheral pulses strong  ABDOMEN: soft, nontender, no hepatosplenomegaly, no masses and bowel sounds normal  MS: no gross musculoskeletal defects noted, no edema        ASSESSMENT / PLAN:     Problem List Items Addressed This Visit        Digestive    Gastroesophageal reflux disease without esophagitis     - on famotidine 20mg BID           Relevant Medications    famotidine (PEPCID) 40 MG tablet       Endocrine    Mixed hyperlipidemia     - " on atorvastatin 40mg qhs             Relevant Medications    atorvastatin (LIPITOR) 40 MG tablet    Other Relevant Orders    Lipid panel reflex to direct LDL Fasting       Circulatory    Benign essential hypertension     controlled  current antihypertensive regimen: hctz/triamterene 25/37.5mg daily  regimen changes: none  intolerance:  future titration/work-up plan:            Relevant Medications    triamterene-HCTZ (MAXZIDE-25) 37.5-25 MG tablet    Other Relevant Orders    Basic metabolic panel       Musculoskeletal and Integumentary    BCC (basal cell carcinoma), face     Working with dermatology; confirmed basal cell carcinoma on left temple -due for more definitive resection           Relevant Medications    cetirizine (ZYRTEC) 10 MG tablet       Other    Health care maintenance     - last colonoscopy in '18 (tubular adenoma) - due in '23  - historically has not pursued PSA testing  - COVID vaccine +boosterx2  - completed pneumococcal vaccines  -Discussed Tdap and Shingrix vaccination  -Arrange for abdominal aortic aneurysm screening (father with history of abdominal aneurysm)             Other Visit Diagnoses     Stable angina pectoris (H)    -  Primary    Concerning exertional pain features.  Arrange for treadmill nuclear stress testing; previous normal treadmill nuclear stress testing in July 2020    Relevant Medications    aspirin (ASA) 81 MG EC tablet    Other Relevant Orders    NM MPI Treadmill    Screening for AAA (abdominal aortic aneurysm)        Relevant Orders    US Aorta Medicare AAA Screening          Patient has been advised of split billing requirements and indicates understanding: Yes    COUNSELING:  Reviewed preventive health counseling, as reflected in patient instructions       Consider AAA screening for ages 65-75 and smoking history       Regular exercise       Colon cancer screening    Estimated body mass index is 34.88 kg/m  as calculated from the following:    Height as of this  "encounter: 1.727 m (5' 8\").    Weight as of this encounter: 104.1 kg (229 lb 6.4 oz).    Weight management plan: Discussed healthy diet and exercise guidelines    He reports that he quit smoking about 40 years ago. His smoking use included cigarettes. He has a 20.00 pack-year smoking history. He has never used smokeless tobacco.      Appropriate preventive services were discussed with this patient, including applicable screening as appropriate for cardiovascular disease, diabetes, osteopenia/osteoporosis, and glaucoma.  As appropriate for age/gender, discussed screening for colorectal cancer, prostate cancer, breast cancer, and cervical cancer. Checklist reviewing preventive services available has been given to the patient.    Reviewed patients plan of care and provided an AVS. The Basic Care Plan (routine screening as documented in Health Maintenance) for Thiago meets the Care Plan requirement. This Care Plan has been established and reviewed with the Patient.    Counseling Resources:  ATP IV Guidelines  Pooled Cohorts Equation Calculator  Breast Cancer Risk Calculator  Breast Cancer: Medication to Reduce Risk  FRAX Risk Assessment  ICSI Preventive Guidelines  Dietary Guidelines for Americans, 2010  USDA's MyPlate  ASA Prophylaxis  Lung CA Screening    Ajith Rivera MD  [facility]    Identified Health Risks:  "

## 2022-08-17 NOTE — ASSESSMENT & PLAN NOTE
Working with dermatology; confirmed basal cell carcinoma on left temple -due for more definitive resection

## 2022-08-17 NOTE — LETTER
August 17, 2022      Thiago Drake   [facility] [address]        Dear ,    We are writing to inform you of your test results.    Stable appearing cholesterol and chemistries.  No specific concerns       Resulted Orders   Lipid panel reflex to direct LDL Fasting   Result Value Ref Range    Cholesterol 191 <200 mg/dL    Triglycerides 105 <150 mg/dL    Direct Measure HDL 47 >=40 mg/dL    LDL Cholesterol Calculated 123 (H) <=100 mg/dL    Non HDL Cholesterol 144 (H) <130 mg/dL    Narrative    Cholesterol  Desirable:  <200 mg/dL    Triglycerides  Normal:  Less than 150 mg/dL  Borderline High:  150-199 mg/dL  High:  200-499 mg/dL  Very High:  Greater than or equal to 500 mg/dL    Direct Measure HDL  Female:  Greater than or equal to 50 mg/dL   Male:  Greater than or equal to 40 mg/dL    LDL Cholesterol  Desirable:  <100mg/dL  Above Desirable:  100-129 mg/dL   Borderline High:  130-159 mg/dL   High:  160-189 mg/dL   Very High:  >= 190 mg/dL    Non HDL Cholesterol  Desirable:  130 mg/dL  Above Desirable:  130-159 mg/dL  Borderline High:  160-189 mg/dL  High:  190-219 mg/dL  Very High:  Greater than or equal to 220 mg/dL   Basic metabolic panel   Result Value Ref Range    Creatinine 1.22 (H) 0.67 - 1.17 mg/dL    Sodium 141 136 - 145 mmol/L    Potassium 4.2 3.4 - 5.3 mmol/L    Urea Nitrogen 24.4 (H) 8.0 - 23.0 mg/dL    Chloride 105 98 - 107 mmol/L    Carbon Dioxide (CO2) 25 22 - 29 mmol/L    Anion Gap 11 7 - 15 mmol/L    Glucose 93 70 - 99 mg/dL    GFR Estimate 64 >60 mL/min/1.73m2      Comment:      Effective December 21, 2021 eGFRcr in adults is calculated using the 2021 CKD-EPI creatinine equation which includes age and gender (Sarthak et al., NEJM, DOI: 10.1056/TDCOhx5117695)    Calcium 9.2 8.8 - 10.2 mg/dL       If you have any questions or concerns, please call the clinic at the number listed above.       Sincerely,      Ajith Rivera MD

## 2022-08-17 NOTE — ASSESSMENT & PLAN NOTE
- last colonoscopy in '18 (tubular adenoma) - due in '23  - historically has not pursued PSA testing  - COVID vaccine +boosterx2  - completed pneumococcal vaccines  -Discussed Tdap and Shingrix vaccination  -Arrange for abdominal aortic aneurysm screening (father with history of abdominal aneurysm)

## 2022-08-17 NOTE — ASSESSMENT & PLAN NOTE
controlled  current antihypertensive regimen: hctz/triamterene 25/37.5mg daily  regimen changes: none  intolerance:  future titration/work-up plan:

## 2022-12-15 ENCOUNTER — TRANSFERRED RECORDS (OUTPATIENT)
Dept: HEALTH INFORMATION MANAGEMENT | Facility: CLINIC | Age: 72
End: 2022-12-15

## 2023-01-17 ENCOUNTER — TRANSFERRED RECORDS (OUTPATIENT)
Dept: HEALTH INFORMATION MANAGEMENT | Facility: CLINIC | Age: 73
End: 2023-01-17

## 2023-01-18 ENCOUNTER — TRANSFERRED RECORDS (OUTPATIENT)
Dept: HEALTH INFORMATION MANAGEMENT | Facility: CLINIC | Age: 73
End: 2023-01-18

## 2023-06-15 ENCOUNTER — TRANSFERRED RECORDS (OUTPATIENT)
Dept: HEALTH INFORMATION MANAGEMENT | Facility: CLINIC | Age: 73
End: 2023-06-15
Payer: COMMERCIAL

## 2023-07-07 ENCOUNTER — OFFICE VISIT (OUTPATIENT)
Dept: FAMILY MEDICINE | Facility: CLINIC | Age: 73
End: 2023-07-07
Payer: COMMERCIAL

## 2023-07-07 VITALS
RESPIRATION RATE: 20 BRPM | DIASTOLIC BLOOD PRESSURE: 84 MMHG | OXYGEN SATURATION: 97 % | HEIGHT: 68 IN | TEMPERATURE: 97.7 F | SYSTOLIC BLOOD PRESSURE: 130 MMHG | HEART RATE: 68 BPM | WEIGHT: 236 LBS | BODY MASS INDEX: 35.77 KG/M2

## 2023-07-07 DIAGNOSIS — L72.3 INFECTED SEBACEOUS CYST: Primary | ICD-10-CM

## 2023-07-07 DIAGNOSIS — L08.9 INFECTED SEBACEOUS CYST: Primary | ICD-10-CM

## 2023-07-07 PROCEDURE — 99213 OFFICE O/P EST LOW 20 MIN: CPT | Performed by: FAMILY MEDICINE

## 2023-07-07 RX ORDER — DOXYCYCLINE 100 MG/1
100 TABLET ORAL 2 TIMES DAILY
Qty: 20 TABLET | Refills: 0 | Status: SHIPPED | OUTPATIENT
Start: 2023-07-07 | End: 2023-07-17

## 2023-07-07 RX ORDER — DOXYCYCLINE 100 MG/1
CAPSULE ORAL
COMMUNITY
Start: 2023-06-16

## 2023-07-07 NOTE — PROGRESS NOTES
"  Assessment & Plan     Infected sebaceous cyst  Patient with infected sebaceous cyst inadequately treated we will resume his doxycycline.  Warm compresses do not squeeze it follow-up with his dermatologist next month for removal call if worse  - doxycycline monohydrate (ADOXA) 100 MG tablet; Take 1 tablet (100 mg) by mouth 2 times daily for 10 days             BMI:   Estimated body mass index is 35.88 kg/m  as calculated from the following:    Height as of this encounter: 1.727 m (5' 8\").    Weight as of this encounter: 107 kg (236 lb).           Mich Valencia MD  [facility]    Marbella Das is a 72 year old, presenting for the following health issues: No fevers chills sweats they have been squeezing it a lot.  Derm Problem (Boil on upper back between shoulder blades. Saw Derm on 6/16 and steroid shots given and rx'd doxy. Stopped after 2 days d/t drowsiness. Restarted 2 days ago. )        7/7/2023    11:42 AM   Additional Questions   Roomed by Val     History of Present Illness       Reason for visit:  Boil on back  Symptom onset:  3-7 days ago    He eats 0-1 servings of fruits and vegetables daily.He consumes 1 sweetened beverage(s) daily.He exercises with enough effort to increase his heart rate 10 to 19 minutes per day.  He exercises with enough effort to increase his heart rate 5 days per week.   He is taking medications regularly.               Review of Systems         Objective    /84 (BP Location: Right arm, Patient Position: Sitting, Cuff Size: Adult Large)   Pulse 68   Temp 97.7  F (36.5  C) (Temporal)   Resp 20   Ht 1.727 m (5' 8\")   Wt 107 kg (236 lb)   SpO2 97%   BMI 35.88 kg/m    Body mass index is 35.88 kg/m .  Physical Exam   Patient has an area of redness swelling over his right upper back area is approximately 3 to 4 cm.  There is some scant bloody discharge but no fluctuance is been squeezed heavily.                    "

## 2023-07-18 ENCOUNTER — TRANSFERRED RECORDS (OUTPATIENT)
Dept: HEALTH INFORMATION MANAGEMENT | Facility: CLINIC | Age: 73
End: 2023-07-18
Payer: COMMERCIAL

## 2023-09-15 ENCOUNTER — TRANSFERRED RECORDS (OUTPATIENT)
Dept: HEALTH INFORMATION MANAGEMENT | Facility: CLINIC | Age: 73
End: 2023-09-15
Payer: COMMERCIAL

## 2023-10-09 ENCOUNTER — OFFICE VISIT (OUTPATIENT)
Dept: FAMILY MEDICINE | Facility: CLINIC | Age: 73
End: 2023-10-09
Payer: COMMERCIAL

## 2023-10-09 ENCOUNTER — NURSE TRIAGE (OUTPATIENT)
Dept: NURSING | Facility: CLINIC | Age: 73
End: 2023-10-09

## 2023-10-09 VITALS
TEMPERATURE: 98 F | RESPIRATION RATE: 20 BRPM | OXYGEN SATURATION: 94 % | WEIGHT: 231.6 LBS | DIASTOLIC BLOOD PRESSURE: 82 MMHG | BODY MASS INDEX: 35.1 KG/M2 | HEART RATE: 79 BPM | SYSTOLIC BLOOD PRESSURE: 133 MMHG | HEIGHT: 68 IN

## 2023-10-09 DIAGNOSIS — I10 BENIGN ESSENTIAL HYPERTENSION: ICD-10-CM

## 2023-10-09 DIAGNOSIS — J40 BRONCHITIS: ICD-10-CM

## 2023-10-09 DIAGNOSIS — J45.20 MILD INTERMITTENT REACTIVE AIRWAY DISEASE WITHOUT COMPLICATION: Primary | ICD-10-CM

## 2023-10-09 PROCEDURE — 99214 OFFICE O/P EST MOD 30 MIN: CPT | Performed by: PHYSICIAN ASSISTANT

## 2023-10-09 RX ORDER — AZITHROMYCIN 250 MG/1
TABLET, FILM COATED ORAL
Qty: 6 TABLET | Refills: 0 | Status: SHIPPED | OUTPATIENT
Start: 2023-10-09 | End: 2023-10-14

## 2023-10-09 RX ORDER — ALBUTEROL SULFATE 0.83 MG/ML
2.5 SOLUTION RESPIRATORY (INHALATION) EVERY 6 HOURS PRN
Qty: 90 ML | Refills: 1 | Status: SHIPPED | OUTPATIENT
Start: 2023-10-09

## 2023-10-09 RX ORDER — PREDNISONE 20 MG/1
40 TABLET ORAL DAILY
Qty: 10 TABLET | Refills: 0 | Status: SHIPPED | OUTPATIENT
Start: 2023-10-09 | End: 2023-10-14

## 2023-10-09 RX ORDER — ALBUTEROL SULFATE 90 UG/1
2 AEROSOL, METERED RESPIRATORY (INHALATION) EVERY 6 HOURS PRN
Qty: 18 G | Refills: 1 | Status: SHIPPED | OUTPATIENT
Start: 2023-10-09

## 2023-10-09 NOTE — PROGRESS NOTES
Assessment & Plan     Mild intermittent reactive airway disease without complication  Refilled albuterol. Given sputum change duration of sx, will add Zithromax and prednisone as ordered.  - albuterol (PROAIR HFA/PROVENTIL HFA/VENTOLIN HFA) 108 (90 Base) MCG/ACT inhaler  Dispense: 18 g; Refill: 1  - albuterol (PROVENTIL) (2.5 MG/3ML) 0.083% neb solution  Dispense: 90 mL; Refill: 1  - azithromycin (ZITHROMAX) 250 MG tablet  Dispense: 6 tablet; Refill: 0  - predniSONE (DELTASONE) 20 MG tablet  Dispense: 10 tablet; Refill: 0    Bronchitis  As above.   - azithromycin (ZITHROMAX) 250 MG tablet  Dispense: 6 tablet; Refill: 0    Autumn Bey PA-C  [facility]    Marbella Das is a 73 year old male who presents to clinic today for the following health issues:  Chief Complaint   Patient presents with    Cough     Covid negative, cough, runny nose, fever. Started last Tuesday.      History of Present Illness       Reason for visit:  Cold cough  Symptom onset:  1-2 weeks ago  Symptoms include:  Cough  Symptom intensity:  Moderate  Symptom progression:  Staying the same  Had these symptoms before:  Yes  Has tried/received treatment for these symptoms:  No  What makes it better:  Cold and flu medicine    He eats 2-3 servings of fruits and vegetables daily.He consumes 2 sweetened beverage(s) daily.He exercises with enough effort to increase his heart rate 20 to 29 minutes per day.  He exercises with enough effort to increase his heart rate 5 days per week.   He is taking medications regularly.    Pt presents to the clinic with concerns re: cough, wheezing, rhionorrhea, fever.    No chest pain.    Nebs 3 x per day, helpful temporarily.  Hx of similar with wheezing though pt reports he has never been dx with asthma or copd.  He has been using his wife's duo neb and albuterol MDI with some improvement. Wife with similar sx. No vomiting or diarrhea. No rash. Covid 19 test earlier in the week  "negative, wife with similar sx also negative on day 7.   Sputum is milky.        Review of Systems  Constitutional, HEENT, cardiovascular, pulmonary, gi and gu systems are negative, except as otherwise noted.      Objective    /82 (BP Location: Right arm, Patient Position: Sitting, Cuff Size: Adult Large)   Pulse 79   Temp 98  F (36.7  C) (Oral)   Resp 20   Ht 1.727 m (5' 7.99\")   Wt 105.1 kg (231 lb 9.6 oz)   SpO2 94%   BMI 35.22 kg/m    Physical Exam   Pt is in no acute distress and appears well  Ears patent B:  TM s intact, non-injected. All land marks easily visibile    Nasal mucosa is non-edematous, no discharge.    Pharynx: non erythematous, tonsils non hypertrophied, No exudate   Neck supple: no adenopathy  Lungs: expiratory wheezing throughout, mild. No rhonchi or rales.   Heart: RRR, no murmur, no thrills or heaves   Ext: no edema  Skin: no rashes    No results found for any visits on 10/09/23.          "

## 2023-10-09 NOTE — TELEPHONE ENCOUNTER
Spoke with Thiago - he states he completed at home COVID test prior to nurse calling and this was negative. Thiago would like to be seen in clinic today. Scheduled with Autumn Bey at 2:20pm.  Advised ED is any shortness of breath or difficulty breathing.   Patient states it is mainly with activity - would like to start with clinic visit.

## 2023-10-09 NOTE — TELEPHONE ENCOUNTER
Nurse Triage SBAR    Is this a 2nd Level Triage? YES, LICENSED PRACTITIONER REVIEW IS REQUIRED    Situation: Shortness of breath    Background: Patient has been sick for a couple of weeks. States that he has tested negative for covid. He states that he has been short of breath at rest and with exertion.  He states that he has needed to use his nebulizer more frequently.  States he has had some low grade fevers. Denies chest pain.     Assessment: Shortness of breath    Protocol Recommended Disposition:   Go To Office Now    Recommendation: patient would like to be seen today with his wife. Please contact him with any further recommendations.      Routed to provider    CATALINA ORO RN    Does the patient meet one of the following criteria for ADS visit consideration? 16+ years old, with an [facility] PCP     TIP  Providers, please consider if this condition is appropriate for management at one of our Acute and Diagnostic Services sites.     If patient is a good candidate, please use dotphrase <dot>triageresponse and select Refer to ADS to document.    Reason for Disposition   MILD difficulty breathing (e.g., minimal/no SOB at rest, SOB with walking, pulse < 100) of new-onset or worse than normal    Additional Information   Negative: SEVERE difficulty breathing (e.g., struggling for each breath, speaks in single words, pulse > 120)   Negative: Breathing stopped and hasn't returned   Negative: Choking on something   Negative: Bluish (or gray) lips or face   Negative: Difficult to awaken or acting confused (e.g., disoriented, slurred speech)   Negative: Passed out (i.e., fainted, collapsed and was not responding)   Negative: Wheezing started suddenly after medicine, an allergic food, or bee sting   Negative: Stridor (harsh sound while breathing in)   Negative: Slow, shallow and weak breathing   Negative: Sounds like a life-threatening emergency to the triager   Negative: Chest pain   Negative: Wheezing (high pitched  "whistling sound) and previous asthma attacks or use of asthma medicines   Negative: Difficulty breathing and within 14 days of COVID-19 Exposure   Negative: Difficulty breathing and only present when coughing   Negative: Difficulty breathing and only from stuffy nose   Negative: Difficulty breathing and only from stuffy nose or runny nose from common cold   Negative: MODERATE difficulty breathing (e.g., speaks in phrases, SOB even at rest, pulse 100-120) of new-onset or worse than normal   Negative: Oxygen level (e.g., pulse oximetry) 90% or lower   Negative: Wheezing can be heard across the room   Negative: Drooling or spitting out saliva (because can't swallow)   Negative: Any history of prior \"blood clot\" in leg or lungs   Negative: Illness requiring prolonged bedrest in past month (e.g., immobilization, long hospital stay)   Negative: Hip or leg fracture (broken bone) in past month (or had cast on leg or ankle in past month)   Negative: Major surgery in the past month   Negative: Long-distance travel in past month (e.g., car, bus, train, plane; with trip lasting 6 or more hours)   Negative: Cancer treatment in past six months (or has cancer now)   Negative: Extra heartbeats, irregular heart beating, or heart is beating very fast (i.e., 'palpitations')   Negative: Fever > 103 F (39.4 C)   Negative: Fever > 101 F (38.3 C) and over 60 years of age   Negative: Fever > 100.0 F (37.8 C) and diabetes mellitus or weak immune system (e.g., HIV positive, cancer chemo, splenectomy, organ transplant, chronic steroids)   Negative: Fever > 100.0 F (37.8 C) and bedridden (e.g., nursing home patient, stroke, chronic illness, recovering from surgery)   Negative: Periods where breathing stops and then resumes normally and bedridden (e.g., nursing home patient, CVA)   Negative: Pregnant or postpartum (from 0 to 6 weeks after delivery)   Negative: Patient sounds very sick or weak to the triager    Protocols used: Breathing " Difficulty-A-OH

## 2023-10-11 RX ORDER — TRIAMTERENE/HYDROCHLOROTHIAZID 37.5-25 MG
1 TABLET ORAL DAILY
Qty: 90 TABLET | Refills: 3 | Status: SHIPPED | OUTPATIENT
Start: 2023-10-11

## 2023-10-19 DIAGNOSIS — E78.2 MIXED HYPERLIPIDEMIA: ICD-10-CM

## 2023-10-19 RX ORDER — ATORVASTATIN CALCIUM 40 MG/1
TABLET, FILM COATED ORAL
Qty: 90 TABLET | Refills: 3 | Status: SHIPPED | OUTPATIENT
Start: 2023-10-19

## 2023-10-27 ENCOUNTER — PATIENT OUTREACH (OUTPATIENT)
Dept: GASTROENTEROLOGY | Facility: CLINIC | Age: 73
End: 2023-10-27
Payer: COMMERCIAL

## 2023-10-27 DIAGNOSIS — Z12.11 SPECIAL SCREENING FOR MALIGNANT NEOPLASMS, COLON: Primary | ICD-10-CM

## 2023-10-27 NOTE — LETTER
October 27, 2023      Thiago Drake   [facility] [id number]            Dear Thiago,    We hope this letter finds you doing well.     Your health is important to us at [facility]. Our records indicate that you could be due, or possibly overdue, for a screening or surveillance colonoscopy if you have not had a colonoscopy since 2/14/18.     An order has been placed for you to have this completed. Our scheduling team will be reaching out to you to assist in getting this scheduled. If you do not hear from them within 7 days or you would like to schedule sooner, please call [phone], option 2 for endoscopy scheduling.     If you believe this is in error and have already had a colonoscopy done, please have your results and recommendations faxed to [phone].    If you have questions about this order or have further concerns, please reach out to your primary care provider.     Facundo     Colorectal Cancer RN Screening Team  [facility]

## 2023-10-27 NOTE — PROGRESS NOTES
"CRC Screening Colonoscopy Referral Review    Patient meets the inclusion criteria for screening colonoscopy standing order.    Ordering/Referring Provider:  Dr. Ajith Rivera    BMI: Estimated body mass index is 35.22 kg/m  as calculated from the following:    Height as of 10/9/23: 1.727 m (5' 7.99\").    Weight as of 10/9/23: 105.1 kg (231 lb 9.6 oz).     Sedation:  Does patient have any of the following conditions affecting sedation?  No medical conditions affecting sedation.    Previous Scopes:  Any previous recommendations or follow up needs based on previous scope?  na / No recommendations.    Medical Concerns to Postpone Order:  Does patient have any of the following medical concerns that should postpone/delay colonoscopy referral?  No medical conditions affecting colonoscopy referral.    Final Referral Details:  Based on patient's medical history patient is appropriate for referral order with moderate sedation. If patient's BMI > 50 do not schedule in ASC.      "

## 2024-01-04 ENCOUNTER — TRANSFERRED RECORDS (OUTPATIENT)
Dept: HEALTH INFORMATION MANAGEMENT | Facility: CLINIC | Age: 74
End: 2024-01-04
Payer: COMMERCIAL

## 2024-09-11 ENCOUNTER — NON-VISIT BILLABLE ENCOUNTER (OUTPATIENT)
Dept: FAMILY MEDICINE | Facility: CLINIC | Age: 74
End: 2024-09-11

## 2024-09-11 ENCOUNTER — TELEPHONE (OUTPATIENT)
Dept: FAMILY MEDICINE | Facility: CLINIC | Age: 74
End: 2024-09-11
Payer: COMMERCIAL

## 2024-09-11 DIAGNOSIS — H72.91 PERFORATED TYMPANIC MEMBRANE, RIGHT: Primary | ICD-10-CM

## 2024-09-11 DIAGNOSIS — H60.8X1 OTHER OTITIS EXTERNA, RIGHT EAR: Primary | ICD-10-CM

## 2024-09-11 DIAGNOSIS — H60.391 INFECTIVE OTITIS EXTERNA, RIGHT: ICD-10-CM

## 2024-09-11 PROCEDURE — 99207 PR NO CHARGE LOS: CPT | Performed by: FAMILY MEDICINE

## 2024-09-11 RX ORDER — CEFDINIR 300 MG/1
300 CAPSULE ORAL 2 TIMES DAILY
Qty: 14 CAPSULE | Refills: 0 | Status: SHIPPED | OUTPATIENT
Start: 2024-09-11

## 2024-09-11 NOTE — ASSESSMENT & PLAN NOTE
"The patient is the  of the patient I was seen today, he has managed by \"look at his right ear, he has been taking leftovers of an antibiotic to treat an otitis externa he has worked but he does not have any additional medications.  I took quick look no remaining evidence of infection there is a small tympanic membrane perforation on the right side I did prescribe additional days of cefdinir, since I was not able to see the ear when he was recently infected.  "

## 2024-09-11 NOTE — TELEPHONE ENCOUNTER
Erroneous encounter  A user error has taken place: encounter opened in error, closed for administrative reasons.

## 2024-10-10 ENCOUNTER — TRANSFERRED RECORDS (OUTPATIENT)
Dept: HEALTH INFORMATION MANAGEMENT | Facility: CLINIC | Age: 74
End: 2024-10-10
Payer: COMMERCIAL

## 2024-10-25 DIAGNOSIS — E78.2 MIXED HYPERLIPIDEMIA: ICD-10-CM

## 2024-10-25 DIAGNOSIS — I10 BENIGN ESSENTIAL HYPERTENSION: ICD-10-CM

## 2024-10-25 RX ORDER — ATORVASTATIN CALCIUM 40 MG/1
TABLET, FILM COATED ORAL
Qty: 90 TABLET | Refills: 3 | Status: SHIPPED | OUTPATIENT
Start: 2024-10-25

## 2024-10-25 RX ORDER — TRIAMTERENE AND HYDROCHLOROTHIAZIDE 37.5; 25 MG/1; MG/1
1 CAPSULE ORAL DAILY
Qty: 90 CAPSULE | Refills: 3 | Status: SHIPPED | OUTPATIENT
Start: 2024-10-25

## 2024-10-28 ENCOUNTER — PATIENT OUTREACH (OUTPATIENT)
Dept: GASTROENTEROLOGY | Facility: CLINIC | Age: 74
End: 2024-10-28
Payer: COMMERCIAL

## 2024-10-28 DIAGNOSIS — Z12.11 SPECIAL SCREENING FOR MALIGNANT NEOPLASMS, COLON: Primary | ICD-10-CM

## 2024-10-28 NOTE — PROGRESS NOTES
"CRC Screening Colonoscopy Referral Review    Patient meets the inclusion criteria for screening colonoscopy standing order.    Ordering/Referring Provider:  Ajith Rivera      BMI: Estimated body mass index is 35.22 kg/m  as calculated from the following:    Height as of 10/9/23: 1.727 m (5' 7.99\").    Weight as of 10/9/23: 105.1 kg (231 lb 9.6 oz).     Sedation:  Does patient have any of the following conditions affecting sedation?  No medical conditions affecting sedation.    Previous Scopes:  Any previous recommendations or follow up needs based on previous scope?  na / No recommendations.    Medical Concerns to Postpone Order:  Does patient have any of the following medical concerns that should postpone/delay colonoscopy referral?  No medical conditions affecting colonoscopy referral.    Final Referral Details:  Based on patient's medical history patient is appropriate for referral order with moderate sedation. If patient's BMI > 50 do not schedule in ASC.  "

## 2024-11-08 ENCOUNTER — OFFICE VISIT (OUTPATIENT)
Dept: FAMILY MEDICINE | Facility: CLINIC | Age: 74
End: 2024-11-08
Payer: COMMERCIAL

## 2024-11-08 ENCOUNTER — NURSE TRIAGE (OUTPATIENT)
Dept: FAMILY MEDICINE | Facility: CLINIC | Age: 74
End: 2024-11-08

## 2024-11-08 VITALS
DIASTOLIC BLOOD PRESSURE: 82 MMHG | RESPIRATION RATE: 22 BRPM | TEMPERATURE: 98.3 F | BODY MASS INDEX: 35.9 KG/M2 | OXYGEN SATURATION: 95 % | WEIGHT: 236.9 LBS | HEART RATE: 80 BPM | HEIGHT: 68 IN | SYSTOLIC BLOOD PRESSURE: 130 MMHG

## 2024-11-08 DIAGNOSIS — J45.20 MILD INTERMITTENT REACTIVE AIRWAY DISEASE WITHOUT COMPLICATION: Primary | ICD-10-CM

## 2024-11-08 DIAGNOSIS — J18.9 LUNG INFECTION: ICD-10-CM

## 2024-11-08 PROCEDURE — 99214 OFFICE O/P EST MOD 30 MIN: CPT | Performed by: PHYSICIAN ASSISTANT

## 2024-11-08 RX ORDER — DOXYCYCLINE HYCLATE 100 MG
100 TABLET ORAL 2 TIMES DAILY
Qty: 20 TABLET | Refills: 0 | Status: SHIPPED | OUTPATIENT
Start: 2024-11-08

## 2024-11-08 RX ORDER — PREDNISONE 20 MG/1
20 TABLET ORAL DAILY
Qty: 7 TABLET | Refills: 0 | Status: SHIPPED | OUTPATIENT
Start: 2024-11-08

## 2024-11-08 ASSESSMENT — ENCOUNTER SYMPTOMS
COUGH: 1
SORE THROAT: 1
FEVER: 1

## 2024-11-08 ASSESSMENT — ASTHMA QUESTIONNAIRES
ACT_TOTALSCORE: 20
QUESTION_2 LAST FOUR WEEKS HOW OFTEN HAVE YOU HAD SHORTNESS OF BREATH: NOT AT ALL
ACT_TOTALSCORE: 20
QUESTION_3 LAST FOUR WEEKS HOW OFTEN DID YOUR ASTHMA SYMPTOMS (WHEEZING, COUGHING, SHORTNESS OF BREATH, CHEST TIGHTNESS OR PAIN) WAKE YOU UP AT NIGHT OR EARLIER THAN USUAL IN THE MORNING: ONCE OR TWICE
QUESTION_1 LAST FOUR WEEKS HOW MUCH OF THE TIME DID YOUR ASTHMA KEEP YOU FROM GETTING AS MUCH DONE AT WORK, SCHOOL OR AT HOME: A LITTLE OF THE TIME
QUESTION_5 LAST FOUR WEEKS HOW WOULD YOU RATE YOUR ASTHMA CONTROL: WELL CONTROLLED
QUESTION_4 LAST FOUR WEEKS HOW OFTEN HAVE YOU USED YOUR RESCUE INHALER OR NEBULIZER MEDICATION (SUCH AS ALBUTEROL): TWO OR THREE TIMES PER WEEK

## 2024-11-08 NOTE — PROGRESS NOTES
"  Assessment & Plan     (J45.20) Mild intermittent reactive airway disease without complication  (primary encounter diagnosis)  Comment: Acute exacerbation  Plan: predniSONE (DELTASONE) 20 MG tablet        Redness own continue albuterol    (J18.9) Lung infection  Comment: Cover for atypicals  Plan: doxycycline hyclate (VIBRA-TABS) 100 MG tablet        He should slowly steadily improve not acutely worsen or will recheck          BMI  Estimated body mass index is 36.02 kg/m  as calculated from the following:    Height as of this encounter: 1.727 m (5' 8\").    Weight as of this encounter: 107.5 kg (236 lb 14.4 oz).             Subjective   Thiago is a 74 year old, presenting for the following health issues:  Pharyngitis, Generalized Body Aches, Cough, Fever, and Otalgia (Bilateral )        11/8/2024     1:55 PM   Additional Questions   Roomed by TACO Byers     74-year-old male with history of reactive airways presents to the clinic with complaint of a productive cough  His wife's been ill for the past 3 weeks she tested negative for COVID and influenza  She is on her second antibiotic and some prednisone starting to show signs of improvement  He has not been anywhere else other than to be around his wife so he does not suspect COVID or influenza  He has a sore throat and some otalgia no otorrhea he felt warm did not actually take his temperature albuterol provides minimal relief    History of Present Illness       Reason for visit:  Ear and throat and lung  Symptom onset:  Today  Symptoms include:  Srore throat ear and lung  Symptom intensity:  Moderate  Symptom progression:  Worsening  Had these symptoms before:  Yes  Has tried/received treatment for these symptoms:  Yes  Previous treatment was successful:  Yes  Prior treatment description:  Sterois and antibiotic  What makes it worse:  No  What makes it better:  Meds   He is taking medications regularly.                     Objective    /82 (BP Location: Right " "arm, Patient Position: Sitting, Cuff Size: Adult Large)   Pulse 80   Temp 98.3  F (36.8  C) (Tympanic)   Resp 22   Ht 1.727 m (5' 8\")   Wt 107.5 kg (236 lb 14.4 oz)   SpO2 95%   BMI 36.02 kg/m    Body mass index is 36.02 kg/m .  Physical Exam alert he has a loose sounding cough  He is afebrile his vital signs are stable  Ears the canals and drums are normal  Conjunctiva pink  Nasal mucosa is minimally inflamed there is no rhinorrhea  Oropharynx mild injection no exudate  Neck supple no adenopathy lungs he is moving air well he has minimal end expiratory wheeze and some scattered rhonchi  Cardiovascular regular in rate and rhythm              Signed Electronically by: AMANDA Sarkar    "

## 2024-11-08 NOTE — TELEPHONE ENCOUNTER
S-(situation): Patient calling for appointment in clinic today.     B-(background): Symptom onset yesterday afternoon. Wife has been sick for 2 weeks.    A-(assessment): Earache, sore throat, congestion, cough, low grade fever. Denies any difficulty breathing. Patient has been using nebulizer at home.     R-(recommendations): RN assisted in scheduling appointment in clinic today, 11/8 with Alek De La Vegasharlene. Advised ED if he develops any shortness of breath or difficulty breathing.      Reason for Disposition   Patient wants to be seen    Additional Information   Negative: SEVERE difficulty breathing (e.g., struggling for each breath, speaks in single words)   Negative: Sounds like a life-threatening emergency to the triager   Negative: Throat culture results, call about   Negative: Productive cough is main symptom   Negative: Runny nose is main symptom   Negative: Drooling or spitting out saliva (because can't swallow)   Negative: Unable to open mouth completely   Negative: Drinking very little and has signs of dehydration (e.g., no urine > 12 hours, very dry mouth, very lightheaded)   Negative: Patient sounds very sick or weak to the triager   Negative: Difficulty breathing (per caller) but not severe   Negative: Fever > 103 F (39.4 C)   Negative: Refuses to drink anything for > 12 hours   Negative: SEVERE sore throat pain   Negative: Pus on tonsils (back of throat) and swollen neck lymph nodes ('glands')   Negative: Earache also present   Negative: Widespread rash (especially chest and abdomen)   Negative: Diabetes mellitus or weak immune system (e.g., HIV positive, cancer chemo, splenectomy, organ transplant, chronic steroids)   Negative: History of rheumatic fever    Protocols used: Sore Throat-A-OH

## 2024-11-20 ENCOUNTER — TRANSFERRED RECORDS (OUTPATIENT)
Dept: HEALTH INFORMATION MANAGEMENT | Facility: CLINIC | Age: 74
End: 2024-11-20
Payer: COMMERCIAL

## 2024-12-16 ENCOUNTER — TELEPHONE (OUTPATIENT)
Dept: FAMILY MEDICINE | Facility: CLINIC | Age: 74
End: 2024-12-16

## 2024-12-16 ENCOUNTER — OFFICE VISIT (OUTPATIENT)
Dept: FAMILY MEDICINE | Facility: CLINIC | Age: 74
End: 2024-12-16
Payer: COMMERCIAL

## 2024-12-16 ENCOUNTER — ANCILLARY PROCEDURE (OUTPATIENT)
Dept: GENERAL RADIOLOGY | Facility: CLINIC | Age: 74
End: 2024-12-16
Attending: INTERNAL MEDICINE
Payer: COMMERCIAL

## 2024-12-16 VITALS
RESPIRATION RATE: 20 BRPM | HEART RATE: 72 BPM | BODY MASS INDEX: 36.37 KG/M2 | WEIGHT: 240 LBS | SYSTOLIC BLOOD PRESSURE: 134 MMHG | TEMPERATURE: 97.3 F | HEIGHT: 68 IN | OXYGEN SATURATION: 99 % | DIASTOLIC BLOOD PRESSURE: 84 MMHG

## 2024-12-16 DIAGNOSIS — J01.01 ACUTE RECURRENT MAXILLARY SINUSITIS: ICD-10-CM

## 2024-12-16 DIAGNOSIS — J44.1 CHRONIC OBSTRUCTIVE PULMONARY DISEASE WITH ACUTE EXACERBATION (H): Primary | ICD-10-CM

## 2024-12-16 DIAGNOSIS — J44.1 CHRONIC OBSTRUCTIVE PULMONARY DISEASE WITH ACUTE EXACERBATION (H): ICD-10-CM

## 2024-12-16 DIAGNOSIS — I10 BENIGN ESSENTIAL HYPERTENSION: ICD-10-CM

## 2024-12-16 PROBLEM — I20.89 STABLE ANGINA PECTORIS (H): Status: RESOLVED | Noted: 2024-12-16 | Resolved: 2024-12-16

## 2024-12-16 PROBLEM — I20.89 STABLE ANGINA PECTORIS (H): Status: ACTIVE | Noted: 2024-12-16

## 2024-12-16 PROCEDURE — 99214 OFFICE O/P EST MOD 30 MIN: CPT | Performed by: INTERNAL MEDICINE

## 2024-12-16 PROCEDURE — 71046 X-RAY EXAM CHEST 2 VIEWS: CPT | Mod: TC | Performed by: RADIOLOGY

## 2024-12-16 RX ORDER — PREDNISONE 20 MG/1
20 TABLET ORAL DAILY
Qty: 7 TABLET | Refills: 0 | Status: SHIPPED | OUTPATIENT
Start: 2024-12-16

## 2024-12-16 ASSESSMENT — ENCOUNTER SYMPTOMS
SHORTNESS OF BREATH: 1
COUGH: 1

## 2024-12-16 NOTE — TELEPHONE ENCOUNTER
Patient calling checking on x-ray status.    RN let him know it has not resulted yet.    CLARISSA Titus  [facility]  [phone]    [facility]   Monday  - Thursday 7 AM - 6 PM    Friday  7 AM - 5 PM     -Please call your clinic for assistance from a nurse after hours.

## 2024-12-16 NOTE — PROGRESS NOTES
"  Assessment & Plan     Chronic obstructive pulmonary disease with acute exacerbation (H)  Patient was seen in ED in September and treated with a course of prednisone and albuterol and again treated last month with doxycycline and prednisone.  He improved each time but recurred.  Doubt pneumonia given absence of fever and chronicity of symptoms.  Patient smoked 20 pack years but does not sound as if he has a definite diagnosis of COPD.  Workup the patient for possible COPD with a chest x-ray and PFT.  Treat him with albuterol and Augmentin.  May be exacerbated by sinusitis.  - predniSONE (DELTASONE) 20 MG tablet; Take 1 tablet (20 mg) by mouth daily.  - General PFT Lab (Please always keep checked); Future  - Pulmonary Function Test; Future  - XR Chest 2 Views; Future    Acute recurrent maxillary sinusitis  Over a month of sinus pressure and purulent drainage.  - amoxicillin-clavulanate (AUGMENTIN) 875-125 MG tablet; Take 1 tablet by mouth 2 times daily for 10 days.    Benign essential hypertension  Controlled with current regimen.          BMI  Estimated body mass index is 36.49 kg/m  as calculated from the following:    Height as of this encounter: 1.727 m (5' 8\").    Weight as of this encounter: 108.9 kg (240 lb).   Weight management plan: Patient was referred to their PCP to discuss a diet and exercise plan.          Subjective   Thiago is a 74 year old, presenting for the following health issues:  Sinus Problem, Cough, and Shortness of Breath (Pt c/o sinus pressure,shortness of breath and productive cough x 2-3 weeks)      12/16/2024     9:22 AM   Additional Questions   Roomed by Andrés ESPAÑA     Sinus Problem   Associated symptoms include cough and shortness of breath.   Cough  Associated symptoms include shortness of breath.   Shortness of Breath  Associated symptoms include coughing.   History of Present Illness       Reason for visit:  Hard time breathing    He eats 0-1 servings of fruits and vegetables daily.He " "consumes 1 sweetened beverage(s) daily.He exercises with enough effort to increase his heart rate 10 to 19 minutes per day.  He exercises with enough effort to increase his heart rate 4 days per week.   He is taking medications regularly.     Patient complains of cough, wheezing, shortness of breath, and sinus pressure with purulent nasal discharge.  He has a history of smoking 20 pack years and chronically uses albuterol.  Sometimes uses his wife's nebulizer.  Fever, chills, headache, myalgia.  He had negative home COVID test.  He was treated with prednisone after an ER visit in September and with prednisone and doxycycline last month for similar symptoms.  This has been a recurrent problem for him.  No angina or chest pain.  No PND, orthopnea, edema.              Review of Systems  Constitutional, HEENT, cardiovascular, pulmonary, gi and gu systems are negative, except as otherwise noted.      Objective    BP (!) 148/81 (BP Location: Right arm, Patient Position: Sitting, Cuff Size: Adult Large)   Pulse 72   Temp 97.3  F (36.3  C) (Tympanic)   Resp 20   Ht 1.727 m (5' 8\")   Wt 108.9 kg (240 lb)   SpO2 99%   BMI 36.49 kg/m    Body mass index is 36.49 kg/m .  Physical Exam   Healthy-appearing obese man in no distress.  No increased work of breathing  HEENT exam reveals scarred to TMs without erythema, no sinus tenderness, no pharyngeal erythema  Neck supple no nodes or thyromegaly  Lung exam reveals scattered end expiratory wheezes bilaterally without rales.  Overall good air movement.  Alert and oriented  Normal mood and affect            Signed Electronically by: Ty Somers MD    "

## 2025-03-24 ENCOUNTER — OFFICE VISIT (OUTPATIENT)
Dept: FAMILY MEDICINE | Facility: CLINIC | Age: 75
End: 2025-03-24
Payer: COMMERCIAL

## 2025-03-24 VITALS
BODY MASS INDEX: 35.83 KG/M2 | RESPIRATION RATE: 16 BRPM | SYSTOLIC BLOOD PRESSURE: 132 MMHG | WEIGHT: 236.4 LBS | HEART RATE: 78 BPM | OXYGEN SATURATION: 97 % | TEMPERATURE: 97.6 F | DIASTOLIC BLOOD PRESSURE: 76 MMHG | HEIGHT: 68 IN

## 2025-03-24 DIAGNOSIS — I10 BENIGN ESSENTIAL HYPERTENSION: ICD-10-CM

## 2025-03-24 DIAGNOSIS — E78.2 MIXED HYPERLIPIDEMIA: ICD-10-CM

## 2025-03-24 DIAGNOSIS — D12.6 ADENOMATOUS POLYP OF COLON, UNSPECIFIED PART OF COLON: ICD-10-CM

## 2025-03-24 DIAGNOSIS — E66.01 CLASS 2 SEVERE OBESITY WITH BODY MASS INDEX (BMI) OF 35 TO 39.9 WITH SERIOUS COMORBIDITY (H): ICD-10-CM

## 2025-03-24 DIAGNOSIS — Z13.6 SCREENING FOR ISCHEMIC HEART DISEASE: ICD-10-CM

## 2025-03-24 DIAGNOSIS — J44.9 CHRONIC OBSTRUCTIVE PULMONARY DISEASE, UNSPECIFIED COPD TYPE (H): ICD-10-CM

## 2025-03-24 DIAGNOSIS — J44.1 CHRONIC OBSTRUCTIVE PULMONARY DISEASE WITH ACUTE EXACERBATION (H): ICD-10-CM

## 2025-03-24 DIAGNOSIS — E66.812 CLASS 2 SEVERE OBESITY WITH BODY MASS INDEX (BMI) OF 35 TO 39.9 WITH SERIOUS COMORBIDITY (H): ICD-10-CM

## 2025-03-24 DIAGNOSIS — Z00.00 MEDICARE ANNUAL WELLNESS VISIT, SUBSEQUENT: Primary | ICD-10-CM

## 2025-03-24 DIAGNOSIS — Z13.6 SCREENING FOR AAA (ABDOMINAL AORTIC ANEURYSM): ICD-10-CM

## 2025-03-24 LAB
ANION GAP SERPL CALCULATED.3IONS-SCNC: 10 MMOL/L (ref 7–15)
BUN SERPL-MCNC: 18.9 MG/DL (ref 8–23)
CALCIUM SERPL-MCNC: 9 MG/DL (ref 8.8–10.4)
CHLORIDE SERPL-SCNC: 102 MMOL/L (ref 98–107)
CHOLEST SERPL-MCNC: 174 MG/DL
CREAT SERPL-MCNC: 1.17 MG/DL (ref 0.67–1.17)
EGFRCR SERPLBLD CKD-EPI 2021: 66 ML/MIN/1.73M2
FASTING STATUS PATIENT QL REPORTED: YES
FASTING STATUS PATIENT QL REPORTED: YES
GLUCOSE SERPL-MCNC: 91 MG/DL (ref 70–99)
HCO3 SERPL-SCNC: 25 MMOL/L (ref 22–29)
HDLC SERPL-MCNC: 44 MG/DL
LDLC SERPL CALC-MCNC: 109 MG/DL
NONHDLC SERPL-MCNC: 130 MG/DL
POTASSIUM SERPL-SCNC: 4.1 MMOL/L (ref 3.4–5.3)
SODIUM SERPL-SCNC: 137 MMOL/L (ref 135–145)
TRIGL SERPL-MCNC: 105 MG/DL

## 2025-03-24 PROCEDURE — 80061 LIPID PANEL: CPT | Performed by: FAMILY MEDICINE

## 2025-03-24 PROCEDURE — G0439 PPPS, SUBSEQ VISIT: HCPCS | Performed by: FAMILY MEDICINE

## 2025-03-24 PROCEDURE — 3078F DIAST BP <80 MM HG: CPT | Performed by: FAMILY MEDICINE

## 2025-03-24 PROCEDURE — 36415 COLL VENOUS BLD VENIPUNCTURE: CPT | Performed by: FAMILY MEDICINE

## 2025-03-24 PROCEDURE — 80048 BASIC METABOLIC PNL TOTAL CA: CPT | Performed by: FAMILY MEDICINE

## 2025-03-24 PROCEDURE — 99213 OFFICE O/P EST LOW 20 MIN: CPT | Mod: 25 | Performed by: FAMILY MEDICINE

## 2025-03-24 PROCEDURE — 3075F SYST BP GE 130 - 139MM HG: CPT | Performed by: FAMILY MEDICINE

## 2025-03-24 RX ORDER — PREDNISONE 20 MG/1
40 TABLET ORAL DAILY PRN
Qty: 10 TABLET | Refills: 3 | Status: SHIPPED | OUTPATIENT
Start: 2025-03-24

## 2025-03-24 RX ORDER — AZITHROMYCIN 250 MG/1
TABLET, FILM COATED ORAL
Qty: 6 TABLET | Refills: 3 | Status: SHIPPED | OUTPATIENT
Start: 2025-03-24 | End: 2025-03-29

## 2025-03-24 SDOH — HEALTH STABILITY: PHYSICAL HEALTH: ON AVERAGE, HOW MANY DAYS PER WEEK DO YOU ENGAGE IN MODERATE TO STRENUOUS EXERCISE (LIKE A BRISK WALK)?: 1 DAY

## 2025-03-24 ASSESSMENT — SOCIAL DETERMINANTS OF HEALTH (SDOH): HOW OFTEN DO YOU GET TOGETHER WITH FRIENDS OR RELATIVES?: TWICE A WEEK

## 2025-03-24 NOTE — PATIENT INSTRUCTIONS
Thank you for visiting us today. Here is a summary of my recommendations based on what we discussed:  I ordered a colonoscopy, and ultrasound to screen for aortic aneurysm and a CT calcium score to see if you are at higher risk for coronary artery disease.  We are faxing the orders for these 3 tests to the local hospital, their phone number radiology department at [phone], please call them if you do not hear from them in the next couple of days.    Fasting labs were done today and we will keep you posted with test results.    Regarding the possible COPD diagnosis, based on your history I did add as needed prednisone and antibiotic to use as indicated, meanwhile I sent a referral for pulmonary function tests also to the local hospital.    We also discussed getting shingles vaccine if you get this in the local pharmacy please let us know so we can update your records.      Please let us know if you have any questions via [employer] or you can call us too.      Austin Rees MD)

## 2025-03-24 NOTE — LETTER
My COPD Action Plan     Name: Thiago Drake    YOB: 1950   Date: 3/24/2025    My doctor: Caleb Rees MD   My clinic: [address] [address] [phone]  My Controller Medicine: none   Dose: NA     My Rescue Medicine: Albuterol (Proair/Ventolin/Proventil) inhaler   Dose: standard     My Flare Up Medicine: Prednisone and Z-pack   Dose: Prednisone 40 mg daily for 5 days     My COPD Severity: unknown until PFT      Use of Oxygen: Oxygen Not Prescribed      Make sure you've had your pneumonia   vaccines.          GREEN ZONE       Doing well today    Usual level of activity and exercise  Usual amount of cough and mucus  No shortness of breath  Usual level of health (thinking clearly, sleeping well, feel like eating) Actions:    Take daily medicines  Use oxygen as prescribed  Follow regular exercise and diet plan  Avoid cigarette smoke and other irritants that harm the lungs           YELLOW ZONE          Having a bad day or flare up    Short of breath more than usual  A lot more sputum (mucus) than usual  Sputum looks yellow, green, tan, brown or bloody  More coughing or wheezing  Fever or chills  Less energy; trouble completing activities  Trouble thinking or focusing  Using quick relief inhaler or nebulizer more often  Poor sleep; symptoms wake me up  Do not feel like eating Actions:    Get plenty of rest  Take daily medicines  Use quick relief inhaler every 4 hours  If you use oxygen, call you doctor to see if you should adjust your oxygen  Do breathing exercises or other things to help you relax  Let a loved one, friend or neighbor know you are feeling worse  Call your care team if you have 2 or more symptoms.  Start taking steroids or antibiotics if directed by your care team           RED ZONE       Need medical care now    Severe shortness of breath (feel you can't breathe)  Fever, chills  Not enough breath to do  any activity  Trouble coughing up mucus, walking or talking  Blood in mucus  Frequent coughing Rescue medicines are not working  Not able to sleep because of breathing  Feel confused or drowsy  Chest pain    Actions:    Call your health care team.  If you cannot reach your care team, call 911 or go to the emergency room.        Annual Reminders:  Meet with Care Team, Flu Shot every Fall  Pharmacy: [facility] [phone]

## 2025-03-24 NOTE — PROGRESS NOTES
Preventive Care Visit  [facility]  Caleb Rees MD, Family Medicine  Mar 24, 2025      Assessment & Plan   Problem List Items Addressed This Visit       Mixed hyperlipidemia    Relevant Orders    Lipid panel reflex to direct LDL Non-fasting    Class 2 severe obesity with body mass index (BMI) of 35 to 39.9 with serious comorbidity (H)    Benign essential hypertension    Relevant Orders    BASIC METABOLIC PANEL    Adenomatous polyp of colon    Relevant Orders    Colonoscopy Screening  Referral    Medicare annual wellness visit, subsequent - Primary    Chronic obstructive pulmonary disease, unspecified COPD type (H)    Relevant Medications    azithromycin (ZITHROMAX) 250 MG tablet    predniSONE (DELTASONE) 20 MG tablet    Other Relevant Orders    General PFT Lab (Please always keep checked)    Screening for ischemic heart disease    Relevant Orders    CT Coronary Calcium Scan      Wellness exam done, he says he will get the shingles vaccine in the pharmacy since he has Medicare insurance, I asked him to keep us updated so we can update his records.  He had a recent Lexiscan which was negative in 2023, it is light chest twinge close to the left shoulder would not be reason enough to repeat this at this time, he is currently asymptomatic.  Nonetheless I think it would be appropriate to do CT calcium scan, although he is already on atorvastatin it would be good to see how aggressive we need to be in his management.  Referral for ultrasound for AAA screening done.  I did do a COPD action plan that takes into account his bronchitis exacerbations that always require prednisone and an antibiotic, I did send his medications with the patient with some refills as needed, we discussed the cases where starting these medications would not be appropriate.  I referred him for pulmonary function tests.  Referral to have a colonoscopy done as well.  Lab work done today we will keep him  updated with test results when available.    For additional information of today's  Assessment and Plan  see the After Visit Summary , where detailed comments of the plan of care were dictated.           Counseling  Appropriate preventive services were addressed with this patient via screening, questionnaire, or discussion as appropriate for fall prevention, nutrition, physical activity, Tobacco-use cessation, social engagement, weight loss and cognition.  Checklist reviewing preventive services available has been given to the patient.  Reviewed patient's diet, addressing concerns and/or questions.   He is at risk for lack of exercise and has been provided with information to increase physical activity for the benefit of his well-being.   He is at risk for psychosocial distress and has been provided with information to reduce risk.   The patient was provided with written information regarding signs of hearing loss.           Subjective   Thiago is a 74 year old, presenting for the following:  Physical        3/24/2025    10:01 AM   Additional Questions   Roomed by KAREN Goel           Thiago is here for a Medicare examination.  In addition to this he was concerned about a bit of a twinge he felt close to his left shoulder after he was shoveling snow about 2 weeks ago.  He noticed the rest of the day he did not really have any shoulder pain or problems.  He did admit to some pain around the upper rib cage close to the shoulder with deep breaths, currently he is asymptomatic.  He has had 2 stress nuclear scans, the last one in 2023 all within normal limits.  Regarding health maintenance he is overdue for his colonoscopy, last one over 5 years ago was positive for an adenomatous polyp.  She is also due for AAA screening with an ultrasound, he does have history of smoking although he quit over 30 years ago.  He does have a family history of AAA through his father.  Records show possible COPD, he is not on any  maintenance medications, he says he may have some asthma exacerbation and gets bronchitis once or twice per year, he ends up always needing a steroid and an antibiotic and he uses albuterol as needed.  It sounds like PFTs were ordered once but he has not completed this yet.  He is caring for his wife who is my patient, she has dementia and other problems that demand frequent care.  He does admit that because of it he tends to overlook his own healthcare issues.  He does exercise from time to time but has not been able to do so lately.  He is aware that he has significant obesity and he thinks there is a lot of room for improvement with this better.               Advance Care Planning  Patient does not have a Health Care Directive: Discussed advance care planning with patient; information given to patient to review.      3/24/2025   General Health   How would you rate your overall physical health? Good   Feel stress (tense, anxious, or unable to sleep) To some extent   (!) STRESS CONCERN      3/24/2025   Nutrition   Diet: Other   If other, please elaborate: picky eater         3/24/2025   Exercise   Days per week of moderate/strenous exercise 1 day   (!) EXERCISE CONCERN      3/24/2025   Social Factors   Frequency of gathering with friends or relatives Twice a week   Worry food won't last until get money to buy more No   Food not last or not have enough money for food? No   Do you have housing? (Housing is defined as stable permanent housing and does not include staying ouside in a car, in a tent, in an abandoned building, in an overnight shelter, or couch-surfing.) Yes   Are you worried about losing your housing? No   Lack of transportation? No   Unable to get utilities (heat,electricity)? No         3/24/2025   Fall Risk   Fallen 2 or more times in the past year? No   Trouble with walking or balance? No          3/24/2025   Activities of Daily Living- Home Safety   Needs help with the following daily activites None  of the above   Safety concerns in the home None of the above         3/24/2025   Dental   Dentist two times every year? Yes         3/24/2025   Hearing Screening   Hearing concerns? (!) I FEEL THAT PEOPLE ARE MUMBLING OR NOT SPEAKING CLEARLY.    (!) I NEED TO ASK PEOPLE TO SPEAK UP OR REPEAT THEMSELVES.       Multiple values from one day are sorted in reverse-chronological order         3/24/2025   Driving Risk Screening   Patient/family members have concerns about driving No         3/24/2025   General Alertness/Fatigue Screening   Have you been more tired than usual lately? No         3/24/2025   Urinary Incontinence Screening   Bothered by leaking urine in past 6 months No           Today's PHQ-2 Score:       3/24/2025    10:07 AM   PHQ-2 (  [employer])   Q1: Little interest or pleasure in doing things 0   Q2: Feeling down, depressed or hopeless 0   PHQ-2 Score 0    Q1: Little interest or pleasure in doing things Not at all   Q2: Feeling down, depressed or hopeless Not at all   PHQ-2 Score 0       Patient-reported           3/24/2025   Substance Use   Alcohol more than 3/day or more than 7/wk No   Do you have a current opioid prescription? No   How severe/bad is pain from 1 to 10? 0/10 (No Pain)   Do you use any other substances recreationally? No     Social History     Tobacco Use    Smoking status: Former     Current packs/day: 0.00     Average packs/day: 1 pack/day for 20.0 years (20.0 ttl pk-yrs)     Types: Cigarettes     Start date:      Quit date:      Years since quittin.2     Passive exposure: Past    Smokeless tobacco: Never   Vaping Use    Vaping status: Never Used   Substance Use Topics    Drug use: Not Currently       ASCVD Risk   The 10-year ASCVD risk score (Pallavi CARVALHO, et al., 2019) is: 21.7%    Values used to calculate the score:      Age: 74 years      Sex: Male      Is Non- : No      Diabetic: No      Tobacco smoker: No      Systolic Blood Pressure:  132 mmHg      Is BP treated: No      HDL Cholesterol: 47 mg/dL      Total Cholesterol: 191 mg/dL            Reviewed and updated as needed this visit by Provider                    Past Medical History:   Diagnosis Date    Hearing aid worn     Tear of lateral meniscus of knee 08/16/2022     Past Surgical History:   Procedure Laterality Date    COLONOSCOPY  02/14/2018    MENISCECTOMY  07/18/2018    Bucket handle tear of lateral menicus of knee    TONSILLECTOMY & ADENOIDECTOMY      no date given     BP Readings from Last 3 Encounters:   03/24/25 132/76   12/16/24 134/84   11/08/24 130/82    Wt Readings from Last 3 Encounters:   03/24/25 107.2 kg (236 lb 6.4 oz)   12/16/24 108.9 kg (240 lb)   11/08/24 107.5 kg (236 lb 14.4 oz)                  Current providers sharing in care for this patient include:  Patient Care Team:  Ajith Rivera MD as PCP - General (HOSPITALIST)  Ty Somers MD as Assigned PCP    The following health maintenance items are reviewed in Epic and correct as of today:  Health Maintenance   Topic Date Due    COPD ACTION PLAN  Never done    ZOSTER IMMUNIZATION (1 of 2) Never done    AORTIC ANEURYSM SCREENING (SYSTEM ASSIGNED)  Never done    COLORECTAL CANCER SCREENING  02/14/2023    MEDICARE ANNUAL WELLNESS VISIT  08/17/2023    BMP  08/17/2023    LIPID  08/17/2023    ANNUAL REVIEW OF HM ORDERS  08/17/2023    COVID-19 Vaccine (7 - Moderna risk 2024-25 season) 04/25/2025    DIABETES SCREENING  08/17/2025    FALL RISK ASSESSMENT  03/24/2026    ADVANCE CARE PLANNING  08/17/2027    DTAP/TDAP/TD IMMUNIZATION (2 - Td or Tdap) 12/10/2034    SPIROMETRY  Completed    PHQ-2 (once per calendar year)  Completed    INFLUENZA VACCINE  Completed    Pneumococcal Vaccine: 50+ Years  Completed    RSV VACCINE  Completed    HPV IMMUNIZATION  Aged Out    MENINGITIS IMMUNIZATION  Aged Out    HEPATITIS C SCREENING  Discontinued            Objective    Exam  /76   Pulse 78   Temp 97.6  F (36.4  C) (Oral)    "Resp 16   Ht 1.727 m (5' 8\")   Wt 107.2 kg (236 lb 6.4 oz)   SpO2 97%   BMI 35.94 kg/m     Estimated body mass index is 35.94 kg/m  as calculated from the following:    Height as of this encounter: 1.727 m (5' 8\").    Weight as of this encounter: 107.2 kg (236 lb 6.4 oz).    Physical Exam    On exam he appears in no acute distress.  HEENT shows normal pupillary reflexes, he does wear glasses and has cataracts that are not producing any blurry vision at this point.  Extraocular muscles intact.  Oropharynx clear he does not wear dentures, no significant malocclusion or dental decay problems seen.  Neck supple, I did not hear any carotid bruits.  Heart regular rate and rhythm, no murmurs heard.  Lungs clear to auscultation bilaterally without adventitious sounds.  Abdomen obese, nontender to palpation.  Musculoskeletal exam reveals normal shoulder range of motion without tenderness or restriction of movement.  Likewise no pain to pressure over the chest wall area.  Lower extremities without edema.  His right knee with history of a meniscal tear, gives out from time to time.         3/24/2025   Mini Cog   Clock Draw Score 2 Normal   3 Item Recall 2 objects recalled   Mini Cog Total Score 4              Signed Electronically by: Caleb Rees MD    "

## 2025-03-26 ENCOUNTER — TELEPHONE (OUTPATIENT)
Dept: FAMILY MEDICINE | Facility: CLINIC | Age: 75
End: 2025-03-26
Payer: COMMERCIAL

## 2025-03-26 NOTE — TELEPHONE ENCOUNTER
General Call      Reason for Call:  Colonoscopy    What are your questions or concerns:  pt would like Colonoscopy order sent to [facility] in [place]. Fax# [phone].    Date of last appointment with provider: 3/24/25    Could we send this information to you in [facility] or would you prefer to receive a phone call?:   Patient would prefer a phone call   Okay to leave a detailed message?: Yes at Home number on file [phone] (home)

## 2025-04-18 ENCOUNTER — HOSPITAL ENCOUNTER (OUTPATIENT)
Dept: CT IMAGING | Facility: CLINIC | Age: 75
Discharge: HOME | End: 2025-04-18
Attending: FAMILY MEDICINE
Payer: MEDICARE

## 2025-04-18 ENCOUNTER — HOSPITAL ENCOUNTER (OUTPATIENT)
Dept: ULTRASOUND IMAGING | Facility: CLINIC | Age: 75
Discharge: HOME | End: 2025-04-18
Attending: FAMILY MEDICINE
Payer: MEDICARE

## 2025-04-18 DIAGNOSIS — Z13.6 SCREENING FOR ISCHEMIC HEART DISEASE: ICD-10-CM

## 2025-04-18 DIAGNOSIS — Z13.6 SCREENING FOR AAA (ABDOMINAL AORTIC ANEURYSM): ICD-10-CM

## 2025-04-18 LAB
CV CALCIUM SCORE AGATSTON LM: 0
CV CALCIUM SCORING AGATSON LAD: 514
CV CALCIUM SCORING AGATSTON CX: 16
CV CALCIUM SCORING AGATSTON RCA: 889
CV CALCIUM SCORING AGATSTON TOTAL: 1419

## 2025-04-18 PROCEDURE — 75571 CT HRT W/O DYE W/CA TEST: CPT

## 2025-04-18 PROCEDURE — 75571 CT HRT W/O DYE W/CA TEST: CPT | Mod: 26 | Performed by: GENERAL ACUTE CARE HOSPITAL

## 2025-04-18 PROCEDURE — 76706 US ABDL AORTA SCREEN AAA: CPT

## 2025-04-20 ENCOUNTER — HEALTH MAINTENANCE LETTER (OUTPATIENT)
Age: 75
End: 2025-04-20

## 2025-04-23 DIAGNOSIS — R93.1 HIGH CORONARY ARTERY CALCIUM SCORE: Primary | ICD-10-CM

## 2025-04-23 RX ORDER — ROSUVASTATIN CALCIUM 40 MG/1
40 TABLET, COATED ORAL DAILY
Qty: 90 TABLET | Refills: 3 | Status: SHIPPED | OUTPATIENT
Start: 2025-04-23